# Patient Record
Sex: FEMALE | Race: WHITE | NOT HISPANIC OR LATINO
[De-identification: names, ages, dates, MRNs, and addresses within clinical notes are randomized per-mention and may not be internally consistent; named-entity substitution may affect disease eponyms.]

---

## 2017-04-13 ENCOUNTER — APPOINTMENT (OUTPATIENT)
Dept: PULMONOLOGY | Facility: CLINIC | Age: 72
End: 2017-04-13

## 2017-04-13 VITALS
SYSTOLIC BLOOD PRESSURE: 120 MMHG | RESPIRATION RATE: 17 BRPM | BODY MASS INDEX: 22.47 KG/M2 | WEIGHT: 119 LBS | HEIGHT: 61 IN | OXYGEN SATURATION: 96 % | HEART RATE: 60 BPM | DIASTOLIC BLOOD PRESSURE: 80 MMHG

## 2017-04-13 DIAGNOSIS — Z87.42 PERSONAL HISTORY OF OTHER DISEASES OF THE FEMALE GENITAL TRACT: ICD-10-CM

## 2017-04-13 RX ORDER — ROSUVASTATIN CALCIUM 5 MG/1
5 TABLET, FILM COATED ORAL
Refills: 0 | Status: ACTIVE | COMMUNITY

## 2017-04-17 ENCOUNTER — LABORATORY RESULT (OUTPATIENT)
Age: 72
End: 2017-04-17

## 2017-04-17 LAB
24R-OH-CALCIDIOL SERPL-MCNC: 40.8 PG/ML
25(OH)D3 SERPL-MCNC: 58.4 NG/ML
BASOPHILS # BLD AUTO: 0.02 K/UL
BASOPHILS NFR BLD AUTO: 0.4 %
EOSINOPHIL # BLD AUTO: 0.03 K/UL
EOSINOPHIL NFR BLD AUTO: 0.6 %
HCT VFR BLD CALC: 41 %
HGB BLD-MCNC: 13.6 G/DL
IGE SER-MCNC: 7 IU/ML
IMM GRANULOCYTES NFR BLD AUTO: 0.4 %
LYMPHOCYTES # BLD AUTO: 1.95 K/UL
LYMPHOCYTES NFR BLD AUTO: 36.7 %
MAN DIFF?: NORMAL
MCHC RBC-ENTMCNC: 31.6 PG
MCHC RBC-ENTMCNC: 33.2 GM/DL
MCV RBC AUTO: 95.3 FL
MONOCYTES # BLD AUTO: 0.27 K/UL
MONOCYTES NFR BLD AUTO: 5.1 %
NEUTROPHILS # BLD AUTO: 3.03 K/UL
NEUTROPHILS NFR BLD AUTO: 56.8 %
PLATELET # BLD AUTO: 230 K/UL
RBC # BLD: 4.3 M/UL
RBC # FLD: 13.2 %
WBC # FLD AUTO: 5.32 K/UL

## 2017-04-18 LAB
A ALTERNATA IGE QN: <0.1 KUA/L
A FUMIGATUS IGE QN: <0.1 KUA/L
C ALBICANS IGE QN: <0.1 KUA/L
C HERBARUM IGE QN: <0.1 KUA/L
CAT DANDER IGE QN: <0.1 KUA/L
CLAM IGE QN: <0.1 KUA/L
CODFISH IGE QN: <0.1 KUA/L
COMMON RAGWEED IGE QN: <0.1 KUA/L
CORN IGE QN: <0.1 KUA/L
COW MILK IGE QN: <0.1 KUA/L
D FARINAE IGE QN: <0.1 KUA/L
D PTERONYSS IGE QN: <0.1 KUA/L
DEPRECATED A ALTERNATA IGE RAST QL: 0
DEPRECATED A FUMIGATUS IGE RAST QL: 0
DEPRECATED C ALBICANS IGE RAST QL: 0
DEPRECATED C HERBARUM IGE RAST QL: 0
DEPRECATED CAT DANDER IGE RAST QL: 0
DEPRECATED CLAM IGE RAST QL: 0
DEPRECATED CODFISH IGE RAST QL: 0
DEPRECATED COMMON RAGWEED IGE RAST QL: 0
DEPRECATED CORN IGE RAST QL: 0
DEPRECATED COW MILK IGE RAST QL: 0
DEPRECATED D FARINAE IGE RAST QL: 0
DEPRECATED D PTERONYSS IGE RAST QL: 0
DEPRECATED DOG DANDER IGE RAST QL: 0
DEPRECATED EGG WHITE IGE RAST QL: 0
DEPRECATED M RACEMOSUS IGE RAST QL: 0
DEPRECATED PEANUT IGE RAST QL: 0
DEPRECATED ROACH IGE RAST QL: 0
DEPRECATED SCALLOP IGE RAST QL: <0.1 KUA/L
DEPRECATED SESAME SEED IGE RAST QL: 0
DEPRECATED SHRIMP IGE RAST QL: 0
DEPRECATED SOYBEAN IGE RAST QL: 0
DEPRECATED TIMOTHY IGE RAST QL: 0
DEPRECATED WALNUT IGE RAST QL: 0
DEPRECATED WHEAT IGE RAST QL: 0
DEPRECATED WHITE OAK IGE RAST QL: 0
DOG DANDER IGE QN: <0.1 KUA/L
EGG WHITE IGE QN: <0.1 KUA/L
M RACEMOSUS IGE QN: <0.1 KUA/L
PEANUT IGE QN: <0.1 KUA/L
ROACH IGE QN: <0.1 KUA/L
SCALLOP IGE QN: 0
SCALLOP IGE QN: <0.1 KUA/L
SESAME SEED IGE QN: <0.1 KUA/L
SOYBEAN IGE QN: <0.1 KUA/L
TIMOTHY IGE QN: <0.1 KUA/L
WALNUT IGE QN: <0.1 KUA/L
WHEAT IGE QN: <0.1 KUA/L
WHITE OAK IGE QN: <0.1 KUA/L

## 2017-04-20 ENCOUNTER — FORM ENCOUNTER (OUTPATIENT)
Age: 72
End: 2017-04-20

## 2017-04-21 ENCOUNTER — OUTPATIENT (OUTPATIENT)
Dept: OUTPATIENT SERVICES | Facility: HOSPITAL | Age: 72
LOS: 1 days | End: 2017-04-21
Payer: MEDICARE

## 2017-04-21 ENCOUNTER — APPOINTMENT (OUTPATIENT)
Dept: PULMONOLOGY | Facility: CLINIC | Age: 72
End: 2017-04-21

## 2017-04-21 ENCOUNTER — APPOINTMENT (OUTPATIENT)
Dept: CT IMAGING | Facility: IMAGING CENTER | Age: 72
End: 2017-04-21

## 2017-04-21 DIAGNOSIS — J39.8 OTHER SPECIFIED DISEASES OF UPPER RESPIRATORY TRACT: ICD-10-CM

## 2017-04-21 PROCEDURE — 71250 CT THORAX DX C-: CPT

## 2017-04-25 ENCOUNTER — TRANSCRIPTION ENCOUNTER (OUTPATIENT)
Age: 72
End: 2017-04-25

## 2017-04-27 ENCOUNTER — MEDICATION RENEWAL (OUTPATIENT)
Age: 72
End: 2017-04-27

## 2017-06-06 ENCOUNTER — APPOINTMENT (OUTPATIENT)
Dept: PULMONOLOGY | Facility: CLINIC | Age: 72
End: 2017-06-06

## 2017-06-06 VITALS
HEART RATE: 74 BPM | DIASTOLIC BLOOD PRESSURE: 70 MMHG | OXYGEN SATURATION: 97 % | BODY MASS INDEX: 22.47 KG/M2 | SYSTOLIC BLOOD PRESSURE: 120 MMHG | HEIGHT: 61 IN | RESPIRATION RATE: 14 BRPM | WEIGHT: 119 LBS

## 2017-06-21 ENCOUNTER — APPOINTMENT (OUTPATIENT)
Dept: OBGYN | Facility: CLINIC | Age: 72
End: 2017-06-21

## 2017-06-21 VITALS
BODY MASS INDEX: 22.28 KG/M2 | DIASTOLIC BLOOD PRESSURE: 50 MMHG | SYSTOLIC BLOOD PRESSURE: 108 MMHG | WEIGHT: 118 LBS | HEIGHT: 61 IN

## 2017-06-22 LAB — HPV HIGH+LOW RISK DNA PNL CVX: NEGATIVE

## 2017-06-27 LAB — CYTOLOGY CVX/VAG DOC THIN PREP: NORMAL

## 2017-12-08 ENCOUNTER — OUTPATIENT (OUTPATIENT)
Dept: OUTPATIENT SERVICES | Facility: HOSPITAL | Age: 72
LOS: 1 days | Discharge: HOME | End: 2017-12-08

## 2018-07-12 ENCOUNTER — APPOINTMENT (OUTPATIENT)
Dept: OBGYN | Facility: CLINIC | Age: 73
End: 2018-07-12
Payer: MEDICARE

## 2018-07-12 VITALS
DIASTOLIC BLOOD PRESSURE: 66 MMHG | WEIGHT: 121.25 LBS | HEIGHT: 61 IN | BODY MASS INDEX: 22.89 KG/M2 | SYSTOLIC BLOOD PRESSURE: 107 MMHG | HEART RATE: 77 BPM

## 2018-07-12 DIAGNOSIS — Z86.19 PERSONAL HISTORY OF OTHER INFECTIOUS AND PARASITIC DISEASES: ICD-10-CM

## 2018-07-12 PROCEDURE — G0101: CPT

## 2018-07-12 RX ORDER — AZITHROMYCIN 500 MG/1
500 TABLET, FILM COATED ORAL DAILY
Qty: 5 | Refills: 0 | Status: COMPLETED | COMMUNITY
Start: 2017-06-06 | End: 2018-07-12

## 2018-07-19 LAB — CYTOLOGY CVX/VAG DOC THIN PREP: NORMAL

## 2018-09-14 ENCOUNTER — APPOINTMENT (OUTPATIENT)
Dept: PULMONOLOGY | Facility: CLINIC | Age: 73
End: 2018-09-14
Payer: MEDICARE

## 2018-09-14 ENCOUNTER — NON-APPOINTMENT (OUTPATIENT)
Age: 73
End: 2018-09-14

## 2018-09-14 VITALS
HEART RATE: 69 BPM | RESPIRATION RATE: 18 BRPM | HEIGHT: 62 IN | OXYGEN SATURATION: 95 % | WEIGHT: 122 LBS | DIASTOLIC BLOOD PRESSURE: 54 MMHG | BODY MASS INDEX: 22.45 KG/M2 | SYSTOLIC BLOOD PRESSURE: 116 MMHG

## 2018-09-14 DIAGNOSIS — J45.909 UNSPECIFIED ASTHMA, UNCOMPLICATED: ICD-10-CM

## 2018-09-14 PROCEDURE — 99214 OFFICE O/P EST MOD 30 MIN: CPT | Mod: 25

## 2018-09-14 PROCEDURE — 94010 BREATHING CAPACITY TEST: CPT | Mod: 59

## 2018-09-14 PROCEDURE — 94726 PLETHYSMOGRAPHY LUNG VOLUMES: CPT

## 2018-09-14 PROCEDURE — 94618 PULMONARY STRESS TESTING: CPT

## 2018-09-14 PROCEDURE — 94729 DIFFUSING CAPACITY: CPT

## 2018-09-14 NOTE — HISTORY OF PRESENT ILLNESS
[FreeTextEntry1] : Ms. Lanza is a 73 year old female with a history of abnormal chest CT, chronic cough, GERD, PND, poor sleep, RAD and SOB  presenting to the office today for a follow up visit. Her chief complaint is difficulty breathing. \par -she notes that she was walking one day which was particularly hot, and she began to have some chest tightness and difficulty breathing. she did not have her inhalers at the time and she describes that chest pressure as the same pain that she when she is laying down to go to sleep \par -she notes that she has been having some diarrhea lately\par -she rates her energy a 5-6, and has been sleeping poorly, only getting about 5-6 hours nightly\par -she states that she has not been snoring\par -she notes that her weight has been stable\par -she denies any headaches, nausea, vomiting, fever, chills, sweats, chest pain, chest pressure, diarrhea, constipation, dysphagia, dizziness, leg swelling, leg pain, itchy eyes, itchy ears, or sour taste in the mouth.

## 2018-09-14 NOTE — REASON FOR VISIT
[Follow-Up] : a follow-up visit [FreeTextEntry1] : abnormal chest CT, chronic cough, GERD, PND, poor sleep, RAD and SOB

## 2018-09-14 NOTE — PROCEDURE
[FreeTextEntry1] : PFT - spi reveals normal flows; FEV1 is 2.29 which is 112% of predicted, normal flow volume loop. Normal diffusion , normal volume ; DLCO is 11.9, which is 74% of predicted. \par \par MRI of cervical spine reveals cervical lordosis and disc bulging\par \par 6 minute walk test reveals a low saturation of 74% with no evidence of dyspnea or fatigue; walked 0.29 miles or 475.60 meters

## 2018-09-14 NOTE — ASSESSMENT
[FreeTextEntry1] : Ms. Lanza is a 73 year old female with a history of SOB / mild asthma / GERD / p\par \par problem 1: asthma\par -continue Breo Ellipta 200 at 1 inhalation QD \par -add Ventolin 2 puffs Q6H \par \par -Asthma is  believed to be caused by inherited (genetic) and environmental factor, but its exact cause is unknown. Asthma may be triggered by allergens, lung infections, or irritants in the air. Asthma triggers are different for each person\par -Inhaler technique reviewed as well as oral hygiene techniques reviewed with patient. Avoidance of cold air, extremes of temperature, rescue inhaler should be used before exercise. Order of medication reviewed with patient. Recommended use of a cool mist humidifier in the bedroom. \par \par problem 2: abnormal Chest CT / ?ILDz / CAD \par --related to ?reflux or ?rheumatological factor \par -all rhumatological blood was negative\par -get -follow up chest CT in 6 months (prone and supine)\par \par problem 3: allergy/post nasal drip syndrome\par -being recommended Arm [employer] Saline or Xlear\par -Environmental measures for allergies were encouraged including mattress and pillow cover, air purifier, and environmental controls.\par \par Problem 4: GERD / LPR\par -add Pepsid Complete 20 mg BID \par -being recommended DGL before meals\par \par Things to avoid including overeating, spicy foods, tight clothing, eating within three hours of bed, this list is not all inclusive. \par -Rule of 2s: avoid eating too much, eating too late, eating too spicy, eating two hours before bed\par -For treatment of reflux, possible options discussed including diet control, H2 blockers, PPIs, as well as coating motility agents discussed as treatment options. Timing of meals and proximity of last meal to sleep were discussed. If symptoms persist, a formal gastrointestinal evaluation is needed. \par \par Problem 5: poor mechanics of breathing\par -Proper breathing techniques were reviewed with an emphasis of exhalation. Patient instructed to breath in for 1 second and out for four seconds. Patient was encouraged to not talk while walking. \par \par Problem 6: poor sleep\par -recommended wearing sunglasses 30min before bed\par -continue Xanax prn\par -add Trazodone 50 mg QHS\par -being recommended Sleep Guard\par -Good sleep hygiene was encouraged including avoiding watching television an hour before bed, keeping caffeine at a low, avoiding reading, television, or anything, in bed, no drinking any liquids three hours before bedtime, and only getting into bed when tired and ready for sleep. \par \par problem 7: ?cardiac disease\par -pt is to f/u with cardiologist Dr. Prakash \par \par Problem 8: hypoxemia\par -noted on 6 minute walk test\par -pt is relative asymptomatic\par -this will need to be repeated, cardiology re-evaluation needed\par \par problem 9 : health maintenance \par -recommended yearly flu shot after October 15\par -recommended strep pneumonia vaccines: Prevnar-13 vaccine, followed by Pneumo vaccine 23 one year following\par -recommended early intervention for Upper Respiratory Infections (URIs)\par -recommended regular osteoporosis evaluations\par -recommended early dermatological evaluations\par -recommended after the age of 50 to the age of 70, colonoscopy every 5 years \par \par F/U in 3 months\par She is encouraged to call with any changes, concerns, or questions

## 2018-09-14 NOTE — ADDENDUM
[FreeTextEntry1] : All medical record entries made by vani Ramos were at Dr. Anup Lanza's, direction and personally dictated by me on (9/14/2018). I have reviewed the chart and agree that the record accurately reflects my personal performance of the history, physical exam, assessment and plan. I have also personally directed, reviewed, and agree with the discharge instructions.

## 2018-09-14 NOTE — PHYSICAL EXAM

## 2018-09-20 ENCOUNTER — CLINICAL ADVICE (OUTPATIENT)
Age: 73
End: 2018-09-20

## 2018-09-25 ENCOUNTER — MEDICATION RENEWAL (OUTPATIENT)
Age: 73
End: 2018-09-25

## 2018-09-25 ENCOUNTER — RX RENEWAL (OUTPATIENT)
Age: 73
End: 2018-09-25

## 2018-09-25 RX ORDER — ALBUTEROL SULFATE 90 UG/1
108 (90 BASE) AEROSOL, METERED RESPIRATORY (INHALATION) EVERY 6 HOURS
Qty: 1 | Refills: 1 | Status: ACTIVE | COMMUNITY
Start: 2018-09-25 | End: 1900-01-01

## 2018-10-05 ENCOUNTER — RX CHANGE (OUTPATIENT)
Age: 73
End: 2018-10-05

## 2018-10-29 ENCOUNTER — RX RENEWAL (OUTPATIENT)
Age: 73
End: 2018-10-29

## 2018-10-29 RX ORDER — FLUTICASONE FUROATE AND VILANTEROL TRIFENATATE 200; 25 UG/1; UG/1
200-25 POWDER RESPIRATORY (INHALATION) DAILY
Qty: 3 | Refills: 1 | Status: DISCONTINUED | COMMUNITY
Start: 2018-09-14 | End: 2018-10-29

## 2018-11-26 ENCOUNTER — RX RENEWAL (OUTPATIENT)
Age: 73
End: 2018-11-26

## 2018-11-30 ENCOUNTER — RX RENEWAL (OUTPATIENT)
Age: 73
End: 2018-11-30

## 2018-11-30 RX ORDER — BUDESONIDE AND FORMOTEROL FUMARATE DIHYDRATE 160; 4.5 UG/1; UG/1
160-4.5 AEROSOL RESPIRATORY (INHALATION) TWICE DAILY
Qty: 3 | Refills: 1 | Status: ACTIVE | COMMUNITY
Start: 2018-10-29 | End: 1900-01-01

## 2018-12-03 ENCOUNTER — RX RENEWAL (OUTPATIENT)
Age: 73
End: 2018-12-03

## 2019-02-14 ENCOUNTER — APPOINTMENT (OUTPATIENT)
Dept: PULMONOLOGY | Facility: CLINIC | Age: 74
End: 2019-02-14

## 2019-03-16 ENCOUNTER — RX RENEWAL (OUTPATIENT)
Age: 74
End: 2019-03-16

## 2019-05-12 ENCOUNTER — RX RENEWAL (OUTPATIENT)
Age: 74
End: 2019-05-12

## 2019-05-26 ENCOUNTER — RX RENEWAL (OUTPATIENT)
Age: 74
End: 2019-05-26

## 2019-06-04 ENCOUNTER — RX RENEWAL (OUTPATIENT)
Age: 74
End: 2019-06-04

## 2020-01-31 ENCOUNTER — APPOINTMENT (OUTPATIENT)
Dept: OBGYN | Facility: CLINIC | Age: 75
End: 2020-01-31
Payer: MEDICARE

## 2020-01-31 VITALS — BODY MASS INDEX: 21.77 KG/M2 | WEIGHT: 119 LBS | DIASTOLIC BLOOD PRESSURE: 70 MMHG | SYSTOLIC BLOOD PRESSURE: 104 MMHG

## 2020-01-31 DIAGNOSIS — Z01.419 ENCOUNTER FOR GYNECOLOGICAL EXAMINATION (GENERAL) (ROUTINE) W/OUT ABNORMAL FINDINGS: ICD-10-CM

## 2020-01-31 PROCEDURE — G0101: CPT

## 2020-01-31 NOTE — PHYSICAL EXAM
[Awake] : awake [Alert] : alert [Acute Distress] : no acute distress [LAD] : no lymphadenopathy [Mass] : no breast mass [Nipple Discharge] : no nipple discharge [Axillary LAD] : no axillary lymphadenopathy [Soft] : soft [Tender] : non tender [Oriented x3] : oriented to person, place, and time [Normal] : vagina [No Bleeding] : there was no active vaginal bleeding [Absent] : absent [Uterine Adnexae] : were not tender and not enlarged [Adnexa Tenderness On The Left] : was tender to palpation

## 2020-02-07 LAB
CYTOLOGY CVX/VAG DOC THIN PREP: ABNORMAL
HPV HIGH+LOW RISK DNA PNL CVX: NOT DETECTED

## 2020-09-16 ENCOUNTER — OUTPATIENT (OUTPATIENT)
Dept: OUTPATIENT SERVICES | Facility: HOSPITAL | Age: 75
LOS: 1 days | Discharge: HOME | End: 2020-09-16

## 2020-09-16 DIAGNOSIS — D68.2 HEREDITARY DEFICIENCY OF OTHER CLOTTING FACTORS: ICD-10-CM

## 2020-09-17 ENCOUNTER — RESULT REVIEW (OUTPATIENT)
Age: 75
End: 2020-09-17

## 2020-09-17 ENCOUNTER — LABORATORY RESULT (OUTPATIENT)
Age: 75
End: 2020-09-17

## 2020-09-17 ENCOUNTER — APPOINTMENT (OUTPATIENT)
Dept: HEMATOLOGY ONCOLOGY | Facility: CLINIC | Age: 75
End: 2020-09-17
Payer: MEDICARE

## 2020-09-17 VITALS
WEIGHT: 118.83 LBS | OXYGEN SATURATION: 96 % | TEMPERATURE: 98 F | RESPIRATION RATE: 16 BRPM | DIASTOLIC BLOOD PRESSURE: 70 MMHG | HEIGHT: 61.97 IN | BODY MASS INDEX: 21.87 KG/M2 | HEART RATE: 80 BPM | SYSTOLIC BLOOD PRESSURE: 120 MMHG

## 2020-09-17 DIAGNOSIS — E78.00 PURE HYPERCHOLESTEROLEMIA, UNSPECIFIED: ICD-10-CM

## 2020-09-17 DIAGNOSIS — H54.40 BLINDNESS, ONE EYE, UNSPECIFIED EYE: ICD-10-CM

## 2020-09-17 LAB
ALBUMIN SERPL ELPH-MCNC: 4.3 G/DL
ALP BLD-CCNC: 70 U/L
ALT SERPL-CCNC: 29 U/L
ANION GAP SERPL CALC-SCNC: 11 MMOL/L
APTT BLD: 34.9 SEC
AST SERPL-CCNC: 29 U/L
BILIRUB SERPL-MCNC: 0.4 MG/DL
BUN SERPL-MCNC: 19 MG/DL
CALCIUM SERPL-MCNC: 9.3 MG/DL
CHLORIDE SERPL-SCNC: 102 MMOL/L
CO2 SERPL-SCNC: 26 MMOL/L
CREAT SERPL-MCNC: 0.61 MG/DL
GLUCOSE SERPL-MCNC: 120 MG/DL
INR PPP: 1.04 RATIO
LDH SERPL-CCNC: 302 U/L
POTASSIUM SERPL-SCNC: 5 MMOL/L
PROT SERPL-MCNC: 6.1 G/DL
PT BLD: 12.3 SEC
SODIUM SERPL-SCNC: 140 MMOL/L

## 2020-09-17 PROCEDURE — 99205 OFFICE O/P NEW HI 60 MIN: CPT

## 2020-09-17 RX ORDER — ALPRAZOLAM 0.25 MG/1
0.25 TABLET ORAL
Qty: 30 | Refills: 0 | Status: DISCONTINUED | COMMUNITY
Start: 2018-08-21 | End: 2020-09-17

## 2020-09-17 RX ORDER — TRAZODONE HYDROCHLORIDE 50 MG/1
50 TABLET ORAL
Qty: 60 | Refills: 1 | Status: DISCONTINUED | COMMUNITY
Start: 2018-09-14 | End: 2020-09-17

## 2020-09-20 NOTE — PHYSICAL EXAM
[Fully active, able to carry on all pre-disease performance without restriction] : Status 0 - Fully active, able to carry on all pre-disease performance without restriction [Normal] : affect appropriate [de-identified] : OD retinal change with pupillary defect post surgery [de-identified] : no palpable spleen enlargement

## 2020-09-20 NOTE — ASSESSMENT
[Supportive] : Goals of care discussed with patient: Supportive [Palliative Care Plan] : not applicable at this time [FreeTextEntry1] : Courtney Lanza is a 75 year old female referred for evaluation of mild spleen enlargement of 14 cm on outside health system CT scan. The patient is without symptoms. There is no history of cancer or disease of the hematopoietic system. She does not have chronic fungal or viral infection. The patient may have autoimmune disease related to her chronic pulmonary infiltrates and she is being evaluated by an endocrine physician.\par I recommend that a bone marrow aspiration and biopsy be performed within 2 weeks to evaluate the hematopoietic system. The spleen enlargement is small (14 cm versus normal of 12 cm) and she has a normal CBC. I will repeat blood studies today and view the peripheral blood smear if available. \par Serum LDH is affected by hemolysis and has mild elevation to 302 due to the hemolysis.\par Platelet count near normal at 144 (normal 150). Request bone marrow testing biopsy and aspiration.\par I told the patient that there are many causes for mild spleen enlargement including rheumatologic disease, auto immune disease, blood disorders, vacular disease, thrombosis, trauma, liver dysfunction and infection.\par RTC 3 weeks
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
## 2020-09-20 NOTE — HISTORY OF PRESENT ILLNESS
[Date: ____________] : Patient's last distress assessment performed on [unfilled]. [Disease:__________________________] : Disease: [unfilled] [de-identified] : Courtney Lanza is a 75 year old female who is seen because of an incidental finding of a spleen enlarged.She does does not feel masses in her abdomen and she has not had a diagnosis of a cancer\par .The patient has a history of interstitial lung disease and she is seen by Dr Anne who had ordered a CT scan of the thorax and abdomen in August 2020. THere is the appearance of ground glass appearance of infiltrates in the lung. Her spleen was noted to measure 14 cm in size which was enlarged from prior scans at [facility].\par Her past medical history is remarkable for "thick blood" in the past; blood would not flow easily in a donation.She had seen me in consultation and there was no apparent coagulopathy.\par The patient had routine blood studies in August 2020 which were normal; WBC 4.400 HGB 13.6  00 normal differential neutrophils 53/ lymphocyte 39/ mnocytes 6 eosinophil 0.5. She had a normal CMP and normal creatine, SGOT and SGPT [FreeTextEntry1] : I am here for the evaluation of spleen enlargement [de-identified] : The patient has not noted any change in her abdominal discomfort which has been treated with Prilosec and which has had the clinical picture of GERD. She has no bleeding no facial or trunk rash. She has not noted any abdominal mass

## 2020-09-20 NOTE — REASON FOR VISIT
[Initial Consultation] : an initial consultation for [Blood Count Assessment] : blood count assessment [Family Member] : family member [Other: _____] : [unfilled] [FreeTextEntry2] : I was sent here for the incidental finding of an enlarged splen

## 2020-09-20 NOTE — CONSULT LETTER
[Dear  ___] : Dear  [unfilled], [Consult Letter:] : I had the pleasure of evaluating your patient, [unfilled]. [Please see my note below.] : Please see my note below. [Consult Closing:] : Thank you very much for allowing me to participate in the care of this patient.  If you have any questions, please do not hesitate to contact me. [Sincerely,] : Sincerely, [DrJuan  ___] : Dr. ANDRES [DrJuan ___] : Dr. ANDRES [FreeTextEntry2] : Flores Jesus\par [address] [facility]\par [address] \par [facility] [id number] [FreeTextEntry3] : Matt Garsia

## 2020-09-20 NOTE — RESULTS/DATA
[FreeTextEntry1] : review of data in EM HR of scans and blood testing out of our system; current CBC WBC 3.79 HGB 13  000 normal differential and appearance of blood cells\par  mild hemolysis falsely elevated\par normal CMP and normal hepatic panel

## 2020-09-29 ENCOUNTER — OUTPATIENT (OUTPATIENT)
Dept: OUTPATIENT SERVICES | Facility: HOSPITAL | Age: 75
LOS: 1 days | End: 2020-09-29
Payer: MEDICARE

## 2020-09-29 DIAGNOSIS — D68.2 HEREDITARY DEFICIENCY OF OTHER CLOTTING FACTORS: ICD-10-CM

## 2020-09-29 DIAGNOSIS — J84.9 INTERSTITIAL PULMONARY DISEASE, UNSPECIFIED: ICD-10-CM

## 2020-09-30 ENCOUNTER — RESULT REVIEW (OUTPATIENT)
Age: 75
End: 2020-09-30

## 2020-09-30 ENCOUNTER — APPOINTMENT (OUTPATIENT)
Dept: HEMATOLOGY ONCOLOGY | Facility: CLINIC | Age: 75
End: 2020-09-30
Payer: MEDICARE

## 2020-09-30 VITALS
SYSTOLIC BLOOD PRESSURE: 116 MMHG | BODY MASS INDEX: 21.91 KG/M2 | OXYGEN SATURATION: 93 % | RESPIRATION RATE: 16 BRPM | HEART RATE: 66 BPM | DIASTOLIC BLOOD PRESSURE: 67 MMHG | HEIGHT: 61.97 IN | TEMPERATURE: 99.5 F | WEIGHT: 119.05 LBS

## 2020-09-30 LAB
BASOPHILS # BLD AUTO: 0 K/UL — SIGNIFICANT CHANGE UP (ref 0–0.2)
BASOPHILS NFR BLD AUTO: 0 % — SIGNIFICANT CHANGE UP (ref 0–2)
BURR CELLS BLD QL SMEAR: PRESENT — SIGNIFICANT CHANGE UP
EOSINOPHIL # BLD AUTO: 0.04 K/UL — SIGNIFICANT CHANGE UP (ref 0–0.5)
EOSINOPHIL NFR BLD AUTO: 1 % — SIGNIFICANT CHANGE UP (ref 0–6)
HCT VFR BLD CALC: 37.4 % — SIGNIFICANT CHANGE UP (ref 34.5–45)
HGB BLD-MCNC: 12.5 G/DL — SIGNIFICANT CHANGE UP (ref 11.5–15.5)
LYMPHOCYTES # BLD AUTO: 0.46 K/UL — LOW (ref 1–3.3)
LYMPHOCYTES # BLD AUTO: 13 % — SIGNIFICANT CHANGE UP (ref 13–44)
MCHC RBC-ENTMCNC: 32 PG — SIGNIFICANT CHANGE UP (ref 27–34)
MCHC RBC-ENTMCNC: 33.4 G/DL — SIGNIFICANT CHANGE UP (ref 32–36)
MCV RBC AUTO: 95.7 FL — SIGNIFICANT CHANGE UP (ref 80–100)
MONOCYTES # BLD AUTO: 0.04 K/UL — SIGNIFICANT CHANGE UP (ref 0–0.9)
MONOCYTES NFR BLD AUTO: 1 % — LOW (ref 2–14)
NEUTROPHILS # BLD AUTO: 3.03 K/UL — SIGNIFICANT CHANGE UP (ref 1.8–7.4)
NEUTROPHILS NFR BLD AUTO: 85 % — HIGH (ref 43–77)
NRBC # BLD: 0 /100 — SIGNIFICANT CHANGE UP (ref 0–0)
NRBC # BLD: SIGNIFICANT CHANGE UP /100 WBCS (ref 0–0)
PLAT MORPH BLD: NORMAL — SIGNIFICANT CHANGE UP
PLATELET # BLD AUTO: 202 K/UL — SIGNIFICANT CHANGE UP (ref 150–400)
POIKILOCYTOSIS BLD QL AUTO: SLIGHT — SIGNIFICANT CHANGE UP
RBC # BLD: 3.91 M/UL — SIGNIFICANT CHANGE UP (ref 3.8–5.2)
RBC # FLD: 13 % — SIGNIFICANT CHANGE UP (ref 10.3–14.5)
RBC BLD AUTO: ABNORMAL
WBC # BLD: 3.56 K/UL — LOW (ref 3.8–10.5)
WBC # FLD AUTO: 3.56 K/UL — LOW (ref 3.8–10.5)

## 2020-09-30 PROCEDURE — 38222 DX BONE MARROW BX & ASPIR: CPT | Mod: RT

## 2020-09-30 PROCEDURE — 88237 TISSUE CULTURE BONE MARROW: CPT

## 2020-09-30 PROCEDURE — 88341 IMHCHEM/IMCYTCHM EA ADD ANTB: CPT

## 2020-09-30 PROCEDURE — 88185 FLOWCYTOMETRY/TC ADD-ON: CPT

## 2020-09-30 PROCEDURE — 88360 TUMOR IMMUNOHISTOCHEM/MANUAL: CPT | Mod: 26

## 2020-09-30 PROCEDURE — 85097 BONE MARROW INTERPRETATION: CPT

## 2020-09-30 PROCEDURE — 88280 CHROMOSOME KARYOTYPE STUDY: CPT

## 2020-09-30 PROCEDURE — 88271 CYTOGENETICS DNA PROBE: CPT

## 2020-09-30 PROCEDURE — 88264 CHROMOSOME ANALYSIS 20-25: CPT

## 2020-09-30 PROCEDURE — 88184 FLOWCYTOMETRY/ TC 1 MARKER: CPT

## 2020-09-30 PROCEDURE — 88305 TISSUE EXAM BY PATHOLOGIST: CPT

## 2020-09-30 PROCEDURE — 88305 TISSUE EXAM BY PATHOLOGIST: CPT | Mod: 26

## 2020-09-30 PROCEDURE — 88189 FLOWCYTOMETRY/READ 16 & >: CPT

## 2020-09-30 PROCEDURE — 88341 IMHCHEM/IMCYTCHM EA ADD ANTB: CPT | Mod: 26,59

## 2020-09-30 PROCEDURE — 88313 SPECIAL STAINS GROUP 2: CPT

## 2020-09-30 PROCEDURE — 88313 SPECIAL STAINS GROUP 2: CPT | Mod: 26

## 2020-09-30 PROCEDURE — 88360 TUMOR IMMUNOHISTOCHEM/MANUAL: CPT

## 2020-09-30 PROCEDURE — 88342 IMHCHEM/IMCYTCHM 1ST ANTB: CPT | Mod: 26,59

## 2020-09-30 PROCEDURE — 88342 IMHCHEM/IMCYTCHM 1ST ANTB: CPT

## 2020-09-30 PROCEDURE — 88275 CYTOGENETICS 100-300: CPT

## 2020-09-30 PROCEDURE — 87205 SMEAR GRAM STAIN: CPT

## 2020-09-30 NOTE — PROCEDURE
[Bone Marrow Biopsy] : bone marrow biopsy [Bone Marrow Aspiration] : bone marrow aspiration  [Patient] : the patient [Verbal Consent Obtained] : verbal consent was obtained prior to the procedure [Patient identification verified] : patient identification verified [Procedure verified and consent obtained] : procedure verified and consent obtained [Laterality verified and correct site marked] : laterality verified and correct site marked [Right] : site: right [Correct positioning] : correct positioning [Prone] : prone [Superior iliac spine was identified] : the superior iliac spine was identified. [The right posterior iliac crest was prepped with betadine and draped, using sterile technique.] : The right posterior iliac crest was prepped with betadine and draped, using sterile technique. [Lidocaine was injected and into the periosteum overlying the site.] : Lidocaine was injected and into the periosteum overlying the site. [Aspirate] : aspirate [Cytogenetics] : cytogenetics [FISH] : FISH [Biopsy] : biopsy [Flow Cytometry] : flow cytometry [] : The patient was instructed to remove the bandage the following AM. The patient may bathe. Acetaminophen may be taken for discomfort, as per package directions.If there are any other problems, the patient was instructed to call the office. The patient verbalized understanding, and is aware of the office contact numbers. [FreeTextEntry1] : History of splenomegaly, elevated LDH, and thrombocytopenia [FreeTextEntry2] : WBC: 3.56 \par Hgb: 12.5\par Hct: 37.4\par Plts: 202\par \par Bone marrow aspiration and biopsy were done. MDS panel sent\par Pt is on 325mg ASA daily, extra pressure applied to site post procedure (>20 min)

## 2020-09-30 NOTE — REASON FOR VISIT
[Bone Marrow Biopsy] : bone marrow biopsy [Bone Marrow Aspiration] : bone marrow aspiration [FreeTextEntry2] : History of splenomegaly, elevated LDH, and thrombocytopenia

## 2020-10-05 ENCOUNTER — RESULT REVIEW (OUTPATIENT)
Age: 75
End: 2020-10-05

## 2020-10-05 LAB
HEMATOPATHOLOGY REPORT: SIGNIFICANT CHANGE UP
TM INTERPRETATION: SIGNIFICANT CHANGE UP

## 2020-10-06 ENCOUNTER — RESULT REVIEW (OUTPATIENT)
Age: 75
End: 2020-10-06

## 2020-10-06 ENCOUNTER — APPOINTMENT (OUTPATIENT)
Dept: HEMATOLOGY ONCOLOGY | Facility: CLINIC | Age: 75
End: 2020-10-06
Payer: MEDICARE

## 2020-10-06 VITALS
BODY MASS INDEX: 22.31 KG/M2 | HEIGHT: 61.97 IN | OXYGEN SATURATION: 96 % | WEIGHT: 121.23 LBS | HEART RATE: 62 BPM | RESPIRATION RATE: 16 BRPM | TEMPERATURE: 98.5 F | SYSTOLIC BLOOD PRESSURE: 109 MMHG | DIASTOLIC BLOOD PRESSURE: 64 MMHG

## 2020-10-06 LAB
ALBUMIN SERPL ELPH-MCNC: 4.8 G/DL
ALP BLD-CCNC: 88 U/L
ALT SERPL-CCNC: 33 U/L
ANION GAP SERPL CALC-SCNC: 11 MMOL/L
AST SERPL-CCNC: 26 U/L
B2 MICROGLOB SERPL-MCNC: 2.7 MG/L
BASOPHILS # BLD AUTO: 0.01 K/UL — SIGNIFICANT CHANGE UP (ref 0–0.2)
BASOPHILS NFR BLD AUTO: 0.3 % — SIGNIFICANT CHANGE UP (ref 0–2)
BILIRUB SERPL-MCNC: 0.4 MG/DL
BUN SERPL-MCNC: 21 MG/DL
CALCIUM SERPL-MCNC: 9.6 MG/DL
CHLORIDE SERPL-SCNC: 104 MMOL/L
CO2 SERPL-SCNC: 28 MMOL/L
CREAT SERPL-MCNC: 0.75 MG/DL
EOSINOPHIL # BLD AUTO: 0.03 K/UL — SIGNIFICANT CHANGE UP (ref 0–0.5)
EOSINOPHIL NFR BLD AUTO: 0.8 % — SIGNIFICANT CHANGE UP (ref 0–6)
GLUCOSE SERPL-MCNC: 110 MG/DL
HCT VFR BLD CALC: 40.6 % — SIGNIFICANT CHANGE UP (ref 34.5–45)
HGB BLD-MCNC: 13.3 G/DL — SIGNIFICANT CHANGE UP (ref 11.5–15.5)
IMM GRANULOCYTES NFR BLD AUTO: 0.3 % — SIGNIFICANT CHANGE UP (ref 0–1.5)
LDH SERPL-CCNC: 233 U/L
LYMPHOCYTES # BLD AUTO: 1.05 K/UL — SIGNIFICANT CHANGE UP (ref 1–3.3)
LYMPHOCYTES # BLD AUTO: 27.1 % — SIGNIFICANT CHANGE UP (ref 13–44)
MCHC RBC-ENTMCNC: 31.5 PG — SIGNIFICANT CHANGE UP (ref 27–34)
MCHC RBC-ENTMCNC: 32.8 G/DL — SIGNIFICANT CHANGE UP (ref 32–36)
MCV RBC AUTO: 96.2 FL — SIGNIFICANT CHANGE UP (ref 80–100)
MONOCYTES # BLD AUTO: 0.47 K/UL — SIGNIFICANT CHANGE UP (ref 0–0.9)
MONOCYTES NFR BLD AUTO: 12.1 % — SIGNIFICANT CHANGE UP (ref 2–14)
NEUTROPHILS # BLD AUTO: 2.3 K/UL — SIGNIFICANT CHANGE UP (ref 1.8–7.4)
NEUTROPHILS NFR BLD AUTO: 59.4 % — SIGNIFICANT CHANGE UP (ref 43–77)
NRBC # BLD: 0 /100 WBCS — SIGNIFICANT CHANGE UP (ref 0–0)
PLATELET # BLD AUTO: 209 K/UL — SIGNIFICANT CHANGE UP (ref 150–400)
POTASSIUM SERPL-SCNC: 4.6 MMOL/L
PROT SERPL-MCNC: 6.4 G/DL
RBC # BLD: 4.22 M/UL — SIGNIFICANT CHANGE UP (ref 3.8–5.2)
RBC # FLD: 12.6 % — SIGNIFICANT CHANGE UP (ref 10.3–14.5)
SODIUM SERPL-SCNC: 143 MMOL/L
WBC # BLD: 3.87 K/UL — SIGNIFICANT CHANGE UP (ref 3.8–10.5)
WBC # FLD AUTO: 3.87 K/UL — SIGNIFICANT CHANGE UP (ref 3.8–10.5)

## 2020-10-06 PROCEDURE — 99215 OFFICE O/P EST HI 40 MIN: CPT

## 2020-10-07 LAB
CHROM ANALY INTERPHASE BLD FISH-IMP: SIGNIFICANT CHANGE UP
CHROM ANALY INTERPHASE BLD FISH-IMP: SIGNIFICANT CHANGE UP
HAV IGM SER QL: NONREACTIVE
HBV CORE IGM SER QL: NONREACTIVE
HBV SURFACE AG SER QL: NONREACTIVE
HCV AB SER QL: NONREACTIVE
HCV S/CO RATIO: 0.03 S/CO

## 2020-10-08 LAB — CHROM ANALY OVERALL INTERP SPEC-IMP: SIGNIFICANT CHANGE UP

## 2020-10-08 NOTE — HISTORY OF PRESENT ILLNESS
[Disease:__________________________] : Disease: [unfilled] [R-FLIPI Score: ___] : R-FLIPI Score: [unfilled] [Date: ____________] : Patient's last distress assessment performed on [unfilled]. [0 - No Distress] : Distress Level: 0 [de-identified] : Courtney Lanza is a 75 year old female who is seen because of an incidental finding of a spleen enlarged.She does does not feel masses in her abdomen and she has not had a diagnosis of a cancer\par .The patient has a history of interstitial lung disease and she is seen by Dr Anne who had ordered a CT scan of the thorax and abdomen in August 2020. THere is the appearance of ground glass appearance of infiltrates in the lung. Her spleen was noted to measure 14 cm in size which was enlarged from prior scans at [facility].\par Her past medical history is remarkable for "thick blood" in the past; blood would not flow easily in a donation.She had seen me in consultation and there was no apparent coagulopathy.\par The patient had routine blood studies in August 2020 which were normal; WBC 4.400 HGB 13.6  00 normal differential neutrophils 53/ lymphocyte 39/ mnocytes 6 eosinophil 0.5. She had a normal CMP and normal creatine, SGOT and SGPT [FreeTextEntry1] : discussion of bone marrow results [de-identified] : Courtney has been working and she has no fever, no chills , no weight loss or fatigue; She has felt well ; The news of the bone marrow showing B cell infiltration has caused concern and today's meeting is to explain to her the nature of the findings and to reassure her about future plans of care.\par No exacerbation of her pulmonary symptoms which I have told her do not appear to be related to B cell lymphoma

## 2020-10-08 NOTE — ASSESSMENT
[Supportive] : Goals of care discussed with patient: Supportive [Palliative Care Plan] : not applicable at this time [FreeTextEntry1] : Courtney Lanza is a 75 year old female referred for evaluation of a mild increase in size of her spleen noted on CT scanning in August 2020. She had no significant lymphadenopathy and no prior history of hematological disease.\par She had a normal CBC although on the day of the bone marrow biopsy she had a slight lowering of the total white cell count.She is not neutropenic. 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 I explained the nature of low grade lymphoma; there are many options including watching the patient off therapy or if the patient prefers the use of IV or oral chemotherapy with immune therapy (rituximab) directed against the CD 20 B cells. Education was offered in this discussion. \par I will request CT/PET imaging to see if small lymph nodes show FDG uptake and if a lymph node biopsy may be considered for confirmation of diagnosis.\par Marginal zone lymphomas of spleen may result in spleen enlargement over time and may require treatment if alterations in blood counts are noted.

## 2020-10-08 NOTE — REASON FOR VISIT
[Initial Consultation] : an initial consultation for [Lymphoma] : lymphoma [Family Member] : family member [Other: _____] : [unfilled] [FreeTextEntry2] : I am here to discuss the findings of the bone marrow

## 2020-10-08 NOTE — REVIEW OF SYSTEMS
[Fatigue] : fatigue [Vision Problems] : vision problems [Negative] : Allergic/Immunologic [Recent Change In Weight] : ~T no recent weight change

## 2020-10-10 ENCOUNTER — APPOINTMENT (OUTPATIENT)
Dept: NUCLEAR MEDICINE | Facility: CLINIC | Age: 75
End: 2020-10-10
Payer: MEDICARE

## 2020-10-10 ENCOUNTER — OUTPATIENT (OUTPATIENT)
Dept: OUTPATIENT SERVICES | Facility: HOSPITAL | Age: 75
LOS: 1 days | End: 2020-10-10
Payer: MEDICARE

## 2020-10-10 DIAGNOSIS — Z00.8 ENCOUNTER FOR OTHER GENERAL EXAMINATION: ICD-10-CM

## 2020-10-10 PROCEDURE — 78815 PET IMAGE W/CT SKULL-THIGH: CPT | Mod: 26,PI

## 2020-10-10 PROCEDURE — A9552: CPT

## 2020-10-10 PROCEDURE — 78815 PET IMAGE W/CT SKULL-THIGH: CPT

## 2020-10-14 ENCOUNTER — TRANSCRIPTION ENCOUNTER (OUTPATIENT)
Age: 75
End: 2020-10-14

## 2020-10-16 ENCOUNTER — APPOINTMENT (OUTPATIENT)
Dept: HEMATOLOGY ONCOLOGY | Facility: CLINIC | Age: 75
End: 2020-10-16
Payer: MEDICARE

## 2020-10-16 VITALS
BODY MASS INDEX: 21.91 KG/M2 | OXYGEN SATURATION: 97 % | SYSTOLIC BLOOD PRESSURE: 102 MMHG | HEIGHT: 61.97 IN | DIASTOLIC BLOOD PRESSURE: 65 MMHG | RESPIRATION RATE: 15 BRPM | WEIGHT: 119.05 LBS | TEMPERATURE: 97.9 F | HEART RATE: 72 BPM

## 2020-10-16 PROCEDURE — 99215 OFFICE O/P EST HI 40 MIN: CPT

## 2020-10-19 NOTE — REASON FOR VISIT
[Follow-Up Visit] : a follow-up visit for [Lymphoma] : lymphoma [Family Member] : family member [Other: _____] : [unfilled] [Blood Count Assessment] : blood count assessment [FreeTextEntry2] : follow up to discuss

## 2020-10-19 NOTE — HISTORY OF PRESENT ILLNESS
[Disease:__________________________] : Disease: [unfilled] [R-FLIPI Score: ___] : R-FLIPI Score: [unfilled] [Date: ____________] : Patient's last distress assessment performed on [unfilled]. [1 - Distress Level] : Distress Level: 1 [de-identified] : Courtney Lanza is a 75 year old female who is seen because of an incidental finding of a spleen enlarged.She does does not feel masses in her abdomen and she has not had a diagnosis of a cancer\par .The patient has a history of interstitial lung disease and she is seen by Dr Anne who had ordered a CT scan of the thorax and abdomen in August 2020. THere is the appearance of ground glass appearance of infiltrates in the lung. Her spleen was noted to measure 14 cm in size which was enlarged from prior scans at [facility].\par Her past medical history is remarkable for "thick blood" in the past; blood would not flow easily in a donation.She had seen me in consultation and there was no apparent coagulopathy.\par The patient had routine blood studies in August 2020 which were normal; WBC 4.400 HGB 13.6  00 normal differential neutrophils 53/ lymphocyte 39/ mnocytes 6 eosinophil 0.5. She had a normal CMP and normal creatine, SGOT and SGPT [FreeTextEntry1] : discussion of proposed immune therapy with rituximab [de-identified] : She has had a CT/PET scan performed this week and has had mild anxiety about results. No fever no fatigue no weight loss and no B systems. She is wearing a mask practicing social distancing and following CDC guidelines in treatment. She has had her influenza vaccination [FreeTextEntry7] : nervousness about CT/PET scan

## 2020-10-19 NOTE — RESULTS/DATA
[FreeTextEntry1] : review of information in the EM HR\par copies of blood testing and CT/PET scan results printed and provided to patient with an explanation

## 2020-10-19 NOTE — ASSESSMENT
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 There is no cervical, intrathoracic ,intra abdominal or pelvic lymph  node enlargement.\par The scan suggests that the spleen may be the only image assessable organ of disease (low grade B cell lymphoma has been found in the bone marrow). I discussed the option deferring therapy until bulk lymph adenopathy or symptoms are seen in which case I would use rituximab and chemotherapy (options include cyclophosphamide, CHOP , CVP or bendamustine). \par If she desires to receive chemotherapy at this time, I have recommended rituximab. I have provide printed educational material on the use and side effects of rituximab treatment and I discussed the treatment with the patient and her two daughters who were present on cell phone. I detailed side effects as discussed in the educational material including but not limited to infusion reactions allergy phenomena, fever, lowering blood pressure, and lowering of immunity to viral infection.  Family visitation limited during COVID 19 pandemic. \par She is not fully decided to receive immune therapy with rituximab as it does lower CD 20 B cells and may lead to increased viral infections. In the current pandemic she may elect to have observation rather than receive B cell suppression therapy. Although the risk  of COViD 19 incidence is not known to be increased in lymphoma patients; I have told the patient that the [facility] patient experience showed worse survival outcome of cancer chemotherapy patients receiving chemotherapy when infected with novel corona virus SARS type 2.\par She is also concerned about vising her grandchildren if she is on chemotherapy and this was discussed in detail\par Jessica will be seen in follow up in 2 weeks\par She has had influenza vaccination this season.

## 2020-10-30 ENCOUNTER — OUTPATIENT (OUTPATIENT)
Dept: OUTPATIENT SERVICES | Facility: HOSPITAL | Age: 75
LOS: 1 days | Discharge: HOME | End: 2020-10-30

## 2020-10-30 DIAGNOSIS — D68.2 HEREDITARY DEFICIENCY OF OTHER CLOTTING FACTORS: ICD-10-CM

## 2020-11-04 ENCOUNTER — APPOINTMENT (OUTPATIENT)
Dept: HEMATOLOGY ONCOLOGY | Facility: CLINIC | Age: 75
End: 2020-11-04

## 2020-11-09 ENCOUNTER — RESULT REVIEW (OUTPATIENT)
Age: 75
End: 2020-11-09

## 2020-11-09 ENCOUNTER — APPOINTMENT (OUTPATIENT)
Dept: HEMATOLOGY ONCOLOGY | Facility: CLINIC | Age: 75
End: 2020-11-09
Payer: MEDICARE

## 2020-11-09 VITALS
OXYGEN SATURATION: 95 % | DIASTOLIC BLOOD PRESSURE: 75 MMHG | SYSTOLIC BLOOD PRESSURE: 127 MMHG | HEIGHT: 60.63 IN | WEIGHT: 119.71 LBS | BODY MASS INDEX: 22.9 KG/M2 | RESPIRATION RATE: 16 BRPM | HEART RATE: 71 BPM | TEMPERATURE: 98.5 F
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
## 2020-11-09 DIAGNOSIS — M81.0 AGE-RELATED OSTEOPOROSIS W/OUT CURRENT PATHOLOGICAL FRACTURE: ICD-10-CM

## 2020-11-09 DIAGNOSIS — J40 BRONCHITIS, NOT SPECIFIED AS ACUTE OR CHRONIC: ICD-10-CM

## 2020-11-09 DIAGNOSIS — S72.002D FRACTURE OF UNSPECIFIED PART OF NECK OF LEFT FEMUR, SUBSEQUENT ENCOUNTER FOR CLOSED FRACTURE WITH ROUTINE HEALING: ICD-10-CM

## 2020-11-09 DIAGNOSIS — S32.312S: ICD-10-CM

## 2020-11-09 DIAGNOSIS — J18.9 BRONCHITIS, NOT SPECIFIED AS ACUTE OR CHRONIC: ICD-10-CM

## 2020-11-09 LAB
BASOPHILS # BLD AUTO: 0.02 K/UL — SIGNIFICANT CHANGE UP (ref 0–0.2)
BASOPHILS NFR BLD AUTO: 0.4 % — SIGNIFICANT CHANGE UP (ref 0–2)
EOSINOPHIL # BLD AUTO: 0.03 K/UL — SIGNIFICANT CHANGE UP (ref 0–0.5)
EOSINOPHIL NFR BLD AUTO: 0.5 % — SIGNIFICANT CHANGE UP (ref 0–6)
HCT VFR BLD CALC: 38.4 % — SIGNIFICANT CHANGE UP (ref 34.5–45)
HGB BLD-MCNC: 12.9 G/DL — SIGNIFICANT CHANGE UP (ref 11.5–15.5)
IMM GRANULOCYTES NFR BLD AUTO: 1.1 % — SIGNIFICANT CHANGE UP (ref 0–1.5)
LYMPHOCYTES # BLD AUTO: 1.09 K/UL — SIGNIFICANT CHANGE UP (ref 1–3.3)
LYMPHOCYTES # BLD AUTO: 19.1 % — SIGNIFICANT CHANGE UP (ref 13–44)
MCHC RBC-ENTMCNC: 31.8 PG — SIGNIFICANT CHANGE UP (ref 27–34)
MCHC RBC-ENTMCNC: 33.6 G/DL — SIGNIFICANT CHANGE UP (ref 32–36)
MCV RBC AUTO: 94.6 FL — SIGNIFICANT CHANGE UP (ref 80–100)
MONOCYTES # BLD AUTO: 0.52 K/UL — SIGNIFICANT CHANGE UP (ref 0–0.9)
MONOCYTES NFR BLD AUTO: 9.1 % — SIGNIFICANT CHANGE UP (ref 2–14)
NEUTROPHILS # BLD AUTO: 3.98 K/UL — SIGNIFICANT CHANGE UP (ref 1.8–7.4)
NEUTROPHILS NFR BLD AUTO: 69.8 % — SIGNIFICANT CHANGE UP (ref 43–77)
NRBC # BLD: 0 /100 WBCS — SIGNIFICANT CHANGE UP (ref 0–0)
PLATELET # BLD AUTO: 195 K/UL — SIGNIFICANT CHANGE UP (ref 150–400)
RBC # BLD: 4.06 M/UL — SIGNIFICANT CHANGE UP (ref 3.8–5.2)
RBC # FLD: 12.4 % — SIGNIFICANT CHANGE UP (ref 10.3–14.5)
RETICS #: 71.5 K/UL — SIGNIFICANT CHANGE UP (ref 25–125)
RETICS/RBC NFR: 1.8 % — SIGNIFICANT CHANGE UP (ref 0.5–2.5)
WBC # BLD: 5.7 K/UL — SIGNIFICANT CHANGE UP (ref 3.8–10.5)
WBC # FLD AUTO: 5.7 K/UL — SIGNIFICANT CHANGE UP (ref 3.8–10.5)

## 2020-11-09 PROCEDURE — 99215 OFFICE O/P EST HI 40 MIN: CPT

## 2020-11-09 RX ORDER — MELOXICAM 15 MG/1
15 TABLET ORAL
Qty: 30 | Refills: 0 | Status: DISCONTINUED | COMMUNITY
Start: 2018-08-31 | End: 2020-11-09

## 2020-11-09 NOTE — REASON FOR VISIT
[Initial Consultation] : an initial consultation for [Blood Count Assessment] : blood count assessment [Family Member] : family member [Other: _____] : [unfilled]

## 2020-11-12 LAB
ALBUMIN MFR SERPL ELPH: 66.4 %
ALBUMIN SERPL ELPH-MCNC: 4.6 G/DL
ALBUMIN SERPL-MCNC: 4.1 G/DL
ALBUMIN/GLOB SERPL: 2 RATIO
ALP BLD-CCNC: 91 U/L
ALPHA1 GLOB MFR SERPL ELPH: 4.5 %
ALPHA1 GLOB SERPL ELPH-MCNC: 0.3 G/DL
ALPHA2 GLOB MFR SERPL ELPH: 11.5 %
ALPHA2 GLOB SERPL ELPH-MCNC: 0.7 G/DL
ALT SERPL-CCNC: 32 U/L
ANION GAP SERPL CALC-SCNC: 13 MMOL/L
AST SERPL-CCNC: 28 U/L
B-GLOBULIN MFR SERPL ELPH: 10.6 %
B-GLOBULIN SERPL ELPH-MCNC: 0.6 G/DL
B2 MICROGLOB SERPL-MCNC: 2.4 MG/L
BILIRUB SERPL-MCNC: 0.3 MG/DL
BUN SERPL-MCNC: 28 MG/DL
CALCIUM SERPL-MCNC: 9.6 MG/DL
CHLORIDE SERPL-SCNC: 101 MMOL/L
CO2 SERPL-SCNC: 26 MMOL/L
CREAT SERPL-MCNC: 0.73 MG/DL
DEPRECATED KAPPA LC FREE/LAMBDA SER: 1.15 RATIO
FERRITIN SERPL-MCNC: 59 NG/ML
GAMMA GLOB FLD ELPH-MCNC: 0.4 G/DL
GAMMA GLOB MFR SERPL ELPH: 7 %
GLUCOSE SERPL-MCNC: 106 MG/DL
HBV CORE IGG+IGM SER QL: NONREACTIVE
HBV SURFACE AB SER QL: NONREACTIVE
HBV SURFACE AG SER QL: NONREACTIVE
HIV1+2 AB SPEC QL IA.RAPID: NONREACTIVE
IGA SER QL IEP: 70 MG/DL
IGG SER QL IEP: 414 MG/DL
IGM SER QL IEP: 38 MG/DL
INTERPRETATION SERPL IEP-IMP: NORMAL
IRON SATN MFR SERPL: 23 %
IRON SERPL-MCNC: 73 UG/DL
KAPPA LC CSF-MCNC: 0.97 MG/DL
KAPPA LC SERPL-MCNC: 1.12 MG/DL
LDH SERPL-CCNC: 205 U/L
M PROTEIN SPEC IFE-MCNC: NORMAL
POTASSIUM SERPL-SCNC: 5 MMOL/L
PROT SERPL-MCNC: 6.1 G/DL
SODIUM SERPL-SCNC: 141 MMOL/L
TIBC SERPL-MCNC: 319 UG/DL
UIBC SERPL-MCNC: 246 UG/DL
VIT B12 SERPL-MCNC: 852 PG/ML

## 2020-11-15 PROBLEM — J40 FREQUENT EPISODES OF BRONCHITIS AND PNEUMONIA: Status: ACTIVE | Noted: 2017-04-13

## 2020-11-15 PROBLEM — M81.0 OSTEOPOROSIS, POST-MENOPAUSAL: Status: ACTIVE | Noted: 2020-11-15
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
## 2020-11-15 NOTE — PHYSICAL EXAM
[Fully active, able to carry on all pre-disease performance without restriction] : Status 0 - Fully active, able to carry on all pre-disease performance without restriction [Normal] : normal spine exam without palpable tenderness, no kyphosis or scoliosis [de-identified] : spleen palp 2 FB below LCM on inspiration, no abd mass or hepatomegaly [de-identified] : no CVAT

## 2020-11-15 NOTE — ASSESSMENT
[Palliative Care Plan] : not applicable at this time [FreeTextEntry1] : Patient is a 75-year-old woman referred for evaluation of mild splenomegaly.  She has significant reflux symptoms as well as early satiety.\par She has no constitutional c/o or LAD.\par She was seen at [facility] recently, where treatment on a clinical trial was recommended with the reason for treatment being the GI complaints.  I told the patient and her daughter that I think it unlikely that her complaints are related to what is rather modest splenomegaly.  Regardless, her GI evaluation needs to be completed and should include upper endoscopy.  In addition to PPI, there are other medical therapies that could be considered in this situation.  I reviewed with the patient and her daughter the I reviewed with the patient and her daughter the L5 guidelines for treatment of follicular lymphoma and other could be extrapolated to this variant of low-grade lymphoproliferative disease.  I also noted that the guidelines are not inviolable and other considerations could arise justifying start of therapy.  I told her that I would support a clinical trial if offered as initial therapy but that I did not think that treatment from my standpoint is needed now.\par Her pulmonary disease is of uncertain etiology.  There has been some consideration that this could represent sarcoid.  She will have a repeat CT of the chest within the next 2-3 months or so at which time a decision can be made as to whether or not she should have a lung biopsy.   No FDG avid findings are described in the lungs.  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  Pulmonary involvement with  marginal zone lymphoma could justify starting treatment if this is demonstrated, especially if she has persistent cough and exertional dyspnea.\par CBC, diff is normal today.  Immunoelectrophoresis,  hepatitis B core and HIV antibodies were ordered to complete baseline bloodwork. \par \par She was urged to see her gastroenterologist and return here if she wishes to be followed here in three months. [Palliative] : Goals of care discussed with patient: Palliative

## 2020-11-15 NOTE — HISTORY OF PRESENT ILLNESS
[Disease:__________________________] : Disease: [unfilled] [Date: ____________] : Patient's last distress assessment performed on [unfilled]. [de-identified] : Ms. 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  Splenomegaly was an incidental finding on a chest CT from 8/2020 which demonstrated bilateral groundglass infiltrates.  She is being followed by Dr. Anne for this.  Abdominal ultrasound from 11/5/2020 showed the spleen measuring 14.5 cm.  A 0.9 cm splenule was seen.  PET/CT showed the mild splenomegaly as well as diffuse low-level SUV positivity.  Pulmonary uptake was not described.  A tiny RUL lung nodule was seen which was too small to characterize. \par Bone marrow aspirate and biopsy on 9/30/2020 showed a hypercellular marrow involved by a CD5 and CD10 negative low-grade B-cell lymphoma, which made up about 10% of marrow cells.  By flow, there was a small (2.5%) monotypic B-cell population expressing kappa, CD19, CD20, CD22, CD11c (dim), FMC 7, minimal CD25 and negative for CD5, CD10, CD23 and .  CLL FISH panel was negative, including TP53.  FISH for BIRC3/MALT1 was also negative.\par CBC, differential and platelets have been for the most part normal.  Beta-2 MG was 2.7 on 10/6/2020.  CMP at that time was normal.  LDH was normal. \par Her major complaints are GI related: significant reflux sxs as well as early satiety.\par She has no constitutional complaints.\par  [FreeTextEntry1] : I am here for the evaluation of spleen enlargement [de-identified] : Initial visit with me, previously seen here by Dr. Jose Daniel Garsia.

## 2020-11-15 NOTE — CONSULT LETTER
[Dear  ___] : Dear  [unfilled], [Consult Letter:] : I had the pleasure of evaluating your patient, [unfilled]. [Please see my note below.] : Please see my note below. [Consult Closing:] : Thank you very much for allowing me to participate in the care of this patient.  If you have any questions, please do not hesitate to contact me. [Sincerely,] : Sincerely, [DrJuan  ___] : Dr. ANDRES [FreeTextEntry2] : Flores Jeuss M.D.\par  [DrJuan ___] : Dr. ANDRES

## 2020-11-23 ENCOUNTER — APPOINTMENT (OUTPATIENT)
Dept: UROLOGY | Facility: CLINIC | Age: 75
End: 2020-11-23
Payer: MEDICARE

## 2020-11-23 DIAGNOSIS — D17.71 BENIGN LIPOMATOUS NEOPLASM OF KIDNEY: ICD-10-CM

## 2020-11-23 PROCEDURE — 99203 OFFICE O/P NEW LOW 30 MIN: CPT

## 2020-12-02 ENCOUNTER — APPOINTMENT (OUTPATIENT)
Dept: THORACIC SURGERY | Facility: CLINIC | Age: 75
End: 2020-12-02

## 2020-12-21 ENCOUNTER — APPOINTMENT (OUTPATIENT)
Dept: PEDIATRIC ALLERGY IMMUNOLOGY | Facility: CLINIC | Age: 75
End: 2020-12-21
Payer: MEDICARE

## 2020-12-21 VITALS
SYSTOLIC BLOOD PRESSURE: 117 MMHG | DIASTOLIC BLOOD PRESSURE: 72 MMHG | HEART RATE: 63 BPM | HEIGHT: 60 IN | WEIGHT: 119 LBS | TEMPERATURE: 96.3 F | BODY MASS INDEX: 23.36 KG/M2 | OXYGEN SATURATION: 94 %

## 2020-12-21 DIAGNOSIS — J18.9 PNEUMONIA, UNSPECIFIED ORGANISM: ICD-10-CM

## 2020-12-21 DIAGNOSIS — R09.82 POSTNASAL DRIP: ICD-10-CM

## 2020-12-21 PROCEDURE — 36415 COLL VENOUS BLD VENIPUNCTURE: CPT

## 2020-12-21 PROCEDURE — 95004 PERQ TESTS W/ALRGNC XTRCS: CPT

## 2020-12-21 PROCEDURE — 99204 OFFICE O/P NEW MOD 45 MIN: CPT | Mod: 25

## 2020-12-21 RX ORDER — ALBUTEROL SULFATE 90 UG/1
108 (90 BASE) AEROSOL, METERED RESPIRATORY (INHALATION) EVERY 6 HOURS
Qty: 3 | Refills: 1 | Status: COMPLETED | COMMUNITY
Start: 2018-09-25 | End: 2020-12-21

## 2020-12-21 RX ORDER — GLIMEPIRIDE 1 MG/1
1 TABLET ORAL
Qty: 30 | Refills: 0 | Status: COMPLETED | COMMUNITY
Start: 2020-08-27

## 2020-12-21 RX ORDER — MONTELUKAST 10 MG/1
10 TABLET, FILM COATED ORAL
Qty: 90 | Refills: 0 | Status: ACTIVE | COMMUNITY
Start: 2020-09-22

## 2020-12-21 RX ORDER — ALENDRONATE SODIUM 70 MG/1
70 TABLET ORAL
Qty: 12 | Refills: 0 | Status: COMPLETED | COMMUNITY
Start: 2020-09-22

## 2020-12-21 RX ORDER — BLOOD SUGAR DIAGNOSTIC
STRIP MISCELLANEOUS
Qty: 100 | Refills: 0 | Status: ACTIVE | COMMUNITY
Start: 2019-09-23

## 2020-12-21 RX ORDER — FAMOTIDINE 40 MG/1
40 TABLET, FILM COATED ORAL
Refills: 0 | Status: COMPLETED | COMMUNITY
Start: 2020-11-09 | End: 2020-12-21

## 2020-12-21 RX ORDER — PREDNISONE 10 MG/1
10 TABLET ORAL
Qty: 30 | Refills: 0 | Status: COMPLETED | COMMUNITY
Start: 2020-08-26

## 2020-12-21 RX ORDER — ALBUTEROL SULFATE 90 UG/1
108 (90 BASE) AEROSOL, METERED RESPIRATORY (INHALATION)
Qty: 1 | Refills: 1 | Status: COMPLETED | COMMUNITY
Start: 2017-04-27 | End: 2020-12-21

## 2020-12-26 ENCOUNTER — NON-APPOINTMENT (OUTPATIENT)
Age: 75
End: 2020-12-26

## 2020-12-26 LAB
C TETANI IGG SER-ACNC: 0.58 IU/ML
CD16+CD56+ CELLS # BLD: 180 /UL
CD16+CD56+ CELLS NFR BLD: 14 %
CD19 CELLS NFR BLD: 280 /UL
CD3 CELLS # BLD: 806 /UL
CD3 CELLS NFR BLD: 64 %
CD3+CD4+ CELLS # BLD: 497 /UL
CD3+CD4+ CELLS NFR BLD: 41 %
CD3+CD4+ CELLS/CD3+CD8+ CLL SPEC: 1.78 RATIO
CD3+CD8+ CELLS # SPEC: 280 /UL
CD3+CD8+ CELLS NFR BLD: 23 %
CELLS.CD3-CD19+/CELLS IN BLOOD: 21 %
CH50 SERPL-MCNC: >100 U/ML
COMPLEMENT, ALTERNATE PATHWAY (AH50): >110
DEPRECATED KAPPA LC FREE/LAMBDA SER: 0.95 RATIO
IGA SER QL IEP: 73 MG/DL
IGG SER QL IEP: 389 MG/DL
IGM SER QL IEP: 35 MG/DL
KAPPA LC CSF-MCNC: 1.01 MG/DL
KAPPA LC SERPL-MCNC: 0.96 MG/DL
M PROTEIN SPEC IFE-MCNC: NORMAL
TOTAL IGE SMQN RAST: 5 KU/L

## 2020-12-26 NOTE — HISTORY OF PRESENT ILLNESS
[de-identified] : ALLYSSA NAVA is a 75 year old White female with coronary artery disease, ILD, prediabetes and recently diagnosed lymphoma who presents for evaluation of low gammaglobulin levels. She was diagnosed with marginal zone lymphoma (Dx 9/2020 via [facility]; follows with Heme/onc, Dr West; on surveillance). She had recent labs from 11/9/2020 that showed diminished IgG and IgA levels. \par \par She reports chronic history of chronic sinopulmonary infections, and reports having 3-4 URIs every year for a long time. She also reports 5 episodes of pneumonia in the past, which she reports were all radiographically confirmed; last was 4 years. However, she says over the past 4 years, she has had an improvement to 0-1 sinopulmonary infections per year. Over the past year, she denies 0 URIs, and she has been prescribed 0 courses of antibiotics. She denies any history of MRSA, bacteremia, fungemia. She denies any family history of immunodeficiency, autoimmunity, miscarriages or early infant deaths. She reports being up to date with all recommended age-appropriate immunizations.\par - she reports  that she received PPSV23 in 2018 and PCV13 - in the fall of 2020\par \par She reports a history of seasonal allergies, usually eye itching, sore throat and post-nasal drip. She reports that these symptoms are worse in the spring time. She reports no antihistamine use, but did use Singulair last night.\par \par She also reports history of adverse reaction to sulfa (Sulfafurazole/Ganitrisin) about 30 years ago. She reports that she was taking the medication for about 3 days and then developed fever to 106F and hypotension. She denies associated bronchospasm, GI symptoms or hives. She has been avoiding all sulfa drugs since then.

## 2020-12-26 NOTE — CONSULT LETTER
[Dear  ___] : Dear  [unfilled], [Consult Letter:] : I had the pleasure of evaluating your patient, [unfilled]. [Please see my note below.] : Please see my note below. [This report is provisional, pending the completion of the evaluation.  A final diagnosis and plan will follow.] : This report is provisional, pending the completion of the evaluation.  A final diagnosis and plan will follow. [Consult Closing:] : Thank you very much for allowing me to participate in the care of this patient.  If you have any questions, please do not hesitate to contact me. [Sincerely,] : Sincerely, [FreeTextEntry2] : LEO CHERRY [FreeTextEntry3] : Gab Nicholas MD\par ___________________________________\par Fellow, Division of Allergy and Immunology\par [facility] [employer] at [facility]\par [facility]\par \par \par \par  [DrJuan  ___] : Dr. ANDRES

## 2020-12-26 NOTE — SOCIAL HISTORY
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

## 2020-12-26 NOTE — REVIEW OF SYSTEMS
[Nl] : Genitourinary [Immunizations are up to date] : Immunizations are up to date [Received Influenza Vaccine this Past Year] : patient has received the Influenza vaccine this past year [Eye Discharge] : eye discharge [Eye Itching] : itchy eyes [Sore Throat] : sore throat [Post Nasal Drip] : post nasal drip [Cough] : cough [Recurrent Sinus Infections] : recurrent sinus infections [Recurrent Bronchitis] : recurrent bronchitis [Recurrent Pneumonia] : ~T recurrent pneumonia [Eye Redness] : no redness [Dry Eyes] : no dryness ~T of the eyes [Swollen Eyelids] : no ~T ~L swollen eyelids [Rhinorrhea] : no rhinorrhea [Nasal Congestion] : no nasal congestion [Throat Itching] : no throat itching [Sneezing] : no sneezing [Recurrent Throat Infections] : no recurrence of throat infections [Recurrent Ear Infections] : no recurrence or ear infections [Recurrent Skin Infections] : no recurrent skin infections

## 2020-12-28 LAB — MANNAN BINDING LECTIN (MBL): 1327 NG/ML

## 2021-01-06 ENCOUNTER — NON-APPOINTMENT (OUTPATIENT)
Age: 76
End: 2021-01-06

## 2021-01-07 LAB
DEPRECATED S PNEUM 1 IGG SER-MCNC: NORMAL
DEPRECATED S PNEUM12 AB SER-ACNC: NORMAL
DEPRECATED S PNEUM14 AB SER-ACNC: NORMAL
DEPRECATED S PNEUM17 IGG SER IA-MCNC: NORMAL
DEPRECATED S PNEUM18 IGG SER IA-MCNC: NORMAL
DEPRECATED S PNEUM19 IGG SER-MCNC: NORMAL
DEPRECATED S PNEUM19 IGG SER-MCNC: NORMAL
DEPRECATED S PNEUM2 IGG SER-MCNC: NORMAL
DEPRECATED S PNEUM20 IGG SER-MCNC: NORMAL
DEPRECATED S PNEUM22 IGG SER-MCNC: NORMAL
DEPRECATED S PNEUM23 AB SER-ACNC: NORMAL
DEPRECATED S PNEUM3 AB SER-ACNC: NORMAL
DEPRECATED S PNEUM34 IGG SER-MCNC: NORMAL
DEPRECATED S PNEUM4 AB SER-ACNC: NORMAL
DEPRECATED S PNEUM6 IGG SER-MCNC: NORMAL
DEPRECATED S PNEUM7 IGG SER-ACNC: NORMAL
DEPRECATED S PNEUM8 AB SER-ACNC: NORMAL
DEPRECATED S PNEUM9 IGG SER-MCNC: NORMAL
STREPTOCOCCUS PNEUMONIAE SEROTYPE 11A: NORMAL
STREPTOCOCCUS PNEUMONIAE SEROTYPE 15B: NORMAL
STREPTOCOCCUS PNEUMONIAE SEROTYPE 33F: NORMAL

## 2021-02-02 ENCOUNTER — OUTPATIENT (OUTPATIENT)
Dept: OUTPATIENT SERVICES | Facility: HOSPITAL | Age: 76
LOS: 1 days | Discharge: HOME | End: 2021-02-02

## 2021-02-02 DIAGNOSIS — D68.2 HEREDITARY DEFICIENCY OF OTHER CLOTTING FACTORS: ICD-10-CM

## 2021-02-03 ENCOUNTER — APPOINTMENT (OUTPATIENT)
Dept: PEDIATRIC ALLERGY IMMUNOLOGY | Facility: CLINIC | Age: 76
End: 2021-02-03

## 2021-02-06 ENCOUNTER — LABORATORY RESULT (OUTPATIENT)
Age: 76
End: 2021-02-06

## 2021-02-08 ENCOUNTER — APPOINTMENT (OUTPATIENT)
Dept: HEMATOLOGY ONCOLOGY | Facility: CLINIC | Age: 76
End: 2021-02-08
Payer: MEDICARE

## 2021-02-08 DIAGNOSIS — Z80.52 FAMILY HISTORY OF MALIGNANT NEOPLASM OF BLADDER: ICD-10-CM

## 2021-02-08 DIAGNOSIS — R06.02 SHORTNESS OF BREATH: ICD-10-CM

## 2021-02-08 DIAGNOSIS — Z82.3 FAMILY HISTORY OF STROKE: ICD-10-CM

## 2021-02-08 DIAGNOSIS — Z83.3 FAMILY HISTORY OF DIABETES MELLITUS: ICD-10-CM

## 2021-02-08 PROCEDURE — 99215 OFFICE O/P EST HI 40 MIN: CPT | Mod: 95

## 2021-02-08 RX ORDER — OXYCODONE 5 MG/1
5 TABLET ORAL
Qty: 20 | Refills: 0 | Status: DISCONTINUED | COMMUNITY
Start: 2020-12-15 | End: 2021-02-08

## 2021-02-11 PROBLEM — R06.02 SOB (SHORTNESS OF BREATH): Status: ACTIVE | Noted: 2017-04-13

## 2021-02-11 PROBLEM — Z82.3 FAMILY HISTORY OF TRANSIENT ISCHEMIC ATTACKS: Status: ACTIVE | Noted: 2017-04-13

## 2021-02-11 NOTE — REASON FOR VISIT
[Home] : at home, [unfilled] , at the time of the visit. [Medical Office: ([facility])___] : at the medical office located in  [Verbal consent obtained from patient] : the patient, [unfilled] [Initial Consultation] : an initial consultation for [Blood Count Assessment] : blood count assessment [Family Member] : family member [Other: _____] : [unfilled]

## 2021-02-11 NOTE — HISTORY OF PRESENT ILLNESS
[Disease:__________________________] : Disease: [unfilled] [Date: ____________] : Patient's last distress assessment performed on [unfilled]. [de-identified] : Ms. 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  Splenomegaly was an incidental finding on a chest CT from 8/2020 which demonstrated bilateral groundglass infiltrates.  She is being followed by Dr. Anne for this.  Abdominal ultrasound from 11/5/2020 showed the spleen measuring 14.5 cm.  A 0.9 cm splenule was seen.  PET/CT showed the mild splenomegaly as well as diffuse low-level SUV positivity.  Pulmonary uptake was not described.  A tiny RUL lung nodule was seen which was too small to characterize. \par Bone marrow aspirate and biopsy on 9/30/2020 showed a hypercellular marrow involved by a CD5 and CD10 negative low-grade B-cell lymphoma, which made up about 10% of marrow cells.  By flow, there was a small (2.5%) monotypic B-cell population expressing kappa, CD19, CD20, CD22, CD11c (dim), FMC 7, minimal CD25 and negative for CD5, CD10, CD23 and .  CLL FISH panel was negative, including TP53.  FISH for BIRC3/MALT1 was also negative.\par CBC, differential and platelets have been for the most part normal.  Beta-2 MG was 2.7 on 10/6/2020.  CMP at that time was normal.  LDH was normal. \par Her major complaints are GI related: significant reflux sxs as well as early satiety.\par She has no constitutional complaints.\par  [FreeTextEntry1] : I am here for the evaluation of spleen enlargement [de-identified] : IMarginal zone lymphoma,  lung involvement, (+) marrow\par Increased MEJIA last couple of months\par No constitutional c/o\par Significant hypogamma, no recent infections\par \par CT chest - increase in ILD 11/23, CT last week slightly worse ILD\par Left robotic VATS with HUNG and LLL wedge resections: MZL in lower lobe, non specific upper lobe inflammatory changes

## 2021-02-11 NOTE — REVIEW OF SYSTEMS
[Fatigue] : fatigue [Vision Problems] : vision problems [Cough] : cough [SOB on Exertion] : shortness of breath during exertion [Joint Pain] : joint pain [Insomnia] : insomnia [Shortness Of Breath] : shortness of breath [Easy Bleeding] : a tendency for easy bleeding [Negative] : Psychiatric [Fever] : no fever [Chills] : no chills [Night Sweats] : no night sweats [Recent Change In Weight] : ~T no recent weight change [Eye Pain] : no eye pain [Red Eyes] : eyes not red [Dry Eyes] : no dryness of the eyes [Dysphagia] : no dysphagia [Loss of Hearing] : no loss of hearing [Nosebleeds] : no nosebleeds [Hoarseness] : no hoarseness [Odynophagia] : no odynophagia [Mucosal Pain] : no mucosal pain [Chest Pain] : no chest pain [Palpitations] : no palpitations [Leg Claudication] : no intermittent leg claudication [Lower Ext Edema] : no lower extremity edema [Wheezing] : no wheezing [Abdominal Pain] : no abdominal pain [Vomiting] : no vomiting [Constipation] : no constipation [Diarrhea] : no diarrhea [Dysuria] : no dysuria [Incontinence] : no incontinence [Joint Stiffness] : no joint stiffness [Muscle Pain] : no muscle pain [Muscle Weakness] : no muscle weakness [Skin Rash] : no skin rash [Skin Wound] : no skin wound [Confused] : no confusion [Dizziness] : no dizziness [Fainting] : no fainting [Difficulty Walking] : no difficulty walking [Suicidal] : not suicidal [Anxiety] : no anxiety [Depression] : no depression [Easy Bruising] : no tendency for easy bruising [Swollen Glands] : no swollen glands [FreeTextEntry2] : related to sleeping [FreeTextEntry9] : cervical spine pain [de-identified] : prediabetic [FreeTextEntry1] : hypogamma

## 2021-02-11 NOTE — ASSESSMENT
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, spleen, marrow.\par Moderately symptomatic with MEJIA; lung dz due to lymphoma, ILD, both could be contributing to pulm c/o\par Continued POD seen in basilar infiltrates\par No further increase noted in spleen size\par Therapy delayed until EOD clarified, tempo of dz progression checked\par Also therapy delayed until completes Covid vaccination\par Not clear if she will respond to vaccine\par Conflicting opinions re prophylactic IVIG - saw two immunologists, one recommends pre emptive use even with several yrs w/o serious sinopulmonary infections, another recommends using IVIG only f infectious problems recur\par Heme practice has been similar to second opinion - e.g. hold on IVIG for now\par 2-3 wks post 2nd Covid vaccine, will begin minimal therapy to assess effect on pulm c/o and pulm imaging findings\par Will give rituximab 375 mg/sq m weekly x 4\par Risks, benefits, potential complications of therapy reviewed and all questions were answered\par \par

## 2021-02-11 NOTE — CONSULT LETTER
[Dear  ___] : Dear  [unfilled], [Consult Letter:] : I had the pleasure of evaluating your patient, [unfilled]. [Please see my note below.] : Please see my note below. [Consult Closing:] : Thank you very much for allowing me to participate in the care of this patient.  If you have any questions, please do not hesitate to contact me. [Sincerely,] : Sincerely, [DrJuan  ___] : Dr. ANDRES [DrJuan ___] : Dr. ANDRES [FreeTextEntry2] : Flores Jesus M.D.\par

## 2021-02-19 ENCOUNTER — RESULT REVIEW (OUTPATIENT)
Age: 76
End: 2021-02-19

## 2021-02-19 ENCOUNTER — APPOINTMENT (OUTPATIENT)
Dept: HEMATOLOGY ONCOLOGY | Facility: CLINIC | Age: 76
End: 2021-02-19
Payer: MEDICARE

## 2021-02-19 VITALS
DIASTOLIC BLOOD PRESSURE: 74 MMHG | HEART RATE: 65 BPM | TEMPERATURE: 97 F | HEIGHT: 60.71 IN | BODY MASS INDEX: 22.77 KG/M2 | WEIGHT: 119.05 LBS | RESPIRATION RATE: 16 BRPM | SYSTOLIC BLOOD PRESSURE: 147 MMHG | OXYGEN SATURATION: 97 %

## 2021-02-19 DIAGNOSIS — Z72.820 SLEEP DEPRIVATION: ICD-10-CM

## 2021-02-19 LAB
BASOPHILS # BLD AUTO: 0.02 K/UL — SIGNIFICANT CHANGE UP (ref 0–0.2)
BASOPHILS NFR BLD AUTO: 0.4 % — SIGNIFICANT CHANGE UP (ref 0–2)
EOSINOPHIL # BLD AUTO: 0.02 K/UL — SIGNIFICANT CHANGE UP (ref 0–0.5)
EOSINOPHIL NFR BLD AUTO: 0.4 % — SIGNIFICANT CHANGE UP (ref 0–6)
HCT VFR BLD CALC: 41.1 % — SIGNIFICANT CHANGE UP (ref 34.5–45)
HGB BLD-MCNC: 13.8 G/DL — SIGNIFICANT CHANGE UP (ref 11.5–15.5)
IMM GRANULOCYTES NFR BLD AUTO: 0.6 % — SIGNIFICANT CHANGE UP (ref 0–1.5)
LYMPHOCYTES # BLD AUTO: 1.82 K/UL — SIGNIFICANT CHANGE UP (ref 1–3.3)
LYMPHOCYTES # BLD AUTO: 35.3 % — SIGNIFICANT CHANGE UP (ref 13–44)
MCHC RBC-ENTMCNC: 31.1 PG — SIGNIFICANT CHANGE UP (ref 27–34)
MCHC RBC-ENTMCNC: 33.6 G/DL — SIGNIFICANT CHANGE UP (ref 32–36)
MCV RBC AUTO: 92.6 FL — SIGNIFICANT CHANGE UP (ref 80–100)
MONOCYTES # BLD AUTO: 0.45 K/UL — SIGNIFICANT CHANGE UP (ref 0–0.9)
MONOCYTES NFR BLD AUTO: 8.7 % — SIGNIFICANT CHANGE UP (ref 2–14)
NEUTROPHILS # BLD AUTO: 2.81 K/UL — SIGNIFICANT CHANGE UP (ref 1.8–7.4)
NEUTROPHILS NFR BLD AUTO: 54.6 % — SIGNIFICANT CHANGE UP (ref 43–77)
NRBC # BLD: 0 /100 WBCS — SIGNIFICANT CHANGE UP (ref 0–0)
PLATELET # BLD AUTO: 229 K/UL — SIGNIFICANT CHANGE UP (ref 150–400)
RBC # BLD: 4.44 M/UL — SIGNIFICANT CHANGE UP (ref 3.8–5.2)
RBC # FLD: 12.8 % — SIGNIFICANT CHANGE UP (ref 10.3–14.5)
WBC # BLD: 5.15 K/UL — SIGNIFICANT CHANGE UP (ref 3.8–10.5)
WBC # FLD AUTO: 5.15 K/UL — SIGNIFICANT CHANGE UP (ref 3.8–10.5)

## 2021-02-19 PROCEDURE — 99215 OFFICE O/P EST HI 40 MIN: CPT

## 2021-02-19 PROCEDURE — 93000 ELECTROCARDIOGRAM COMPLETE: CPT

## 2021-02-19 RX ORDER — PNV NO.95/FERROUS FUM/FOLIC AC 28MG-0.8MG
TABLET ORAL
Refills: 0 | Status: ACTIVE | COMMUNITY

## 2021-02-19 RX ORDER — CYANOCOBALAMIN (VITAMIN B-12) 500 MCG
400 LOZENGE ORAL
Refills: 0 | Status: ACTIVE | COMMUNITY

## 2021-02-19 RX ORDER — B-COMPLEX WITH VITAMIN C
TABLET ORAL
Refills: 0 | Status: ACTIVE | COMMUNITY

## 2021-02-19 RX ORDER — TRAZODONE HYDROCHLORIDE 150 MG/1
150 TABLET ORAL
Qty: 30 | Refills: 1 | Status: COMPLETED | COMMUNITY
Start: 2018-10-05 | End: 2021-02-19

## 2021-02-21 NOTE — ASSESSMENT
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, spleen, marrow.\par Remains `oderately symptomatic with MEJIA; lung dz due to lymphoma, ILD, both could be contributing to pulm c/o\par Continued POD seen in basilar infiltrates\par No further increase noted in spleen size\par Therapy had been delayed until EOD clarified, tempo of dz progression checked\par Also therapy delayed until completed Covid vaccination, which she did about 2 wks ago\par Not clear if she will respond to vaccine\par Conflicting opinions re prophylactic IVIG - saw two immunologists, one recommends pre emptive use even with several yrs w/o serious sinopulmonary infections, another recommends using IVIG only if infectious problems recur\par Heme practice has been similar to second opinion - e.g. hold on IVIG for now\par IVIG therapy reviewed again and we agree to hold off for now\par Treatment with rituxan reviewed again with pt and two dtrs, including risk, potential benefits and toxicities and all questions were answered\par Signed, informed consent obtained\par Will give rituximab 375 mg/sq m weekly x 4 starting next week\par \par \par

## 2021-02-21 NOTE — CONSULT LETTER
[Dear  ___] : Dear  [unfilled], [Consult Letter:] : I had the pleasure of evaluating your patient, [unfilled]. [Please see my note below.] : Please see my note below. [Sincerely,] : Sincerely, [Consult Closing:] : Thank you very much for allowing me to participate in the care of this patient.  If you have any questions, please do not hesitate to contact me. [FreeTextEntry2] : Flores Jesus M.D.\par  [DrJuan  ___] : Dr. ANDRES [DrJuan ___] : Dr. ANDRES

## 2021-02-21 NOTE — REVIEW OF SYSTEMS
[Fatigue] : fatigue [SOB on Exertion] : shortness of breath during exertion [Fever] : no fever [Chills] : no chills [Night Sweats] : no night sweats [Recent Change In Weight] : ~T no recent weight change [Eye Pain] : no eye pain [Red Eyes] : eyes not red [Dry Eyes] : no dryness of the eyes [Vision Problems] : vision problems [Dysphagia] : no dysphagia [Loss of Hearing] : no loss of hearing [Nosebleeds] : no nosebleeds [Hoarseness] : no hoarseness [Odynophagia] : no odynophagia [Mucosal Pain] : no mucosal pain [Chest Pain] : no chest pain [Palpitations] : no palpitations [Leg Claudication] : no intermittent leg claudication [Lower Ext Edema] : no lower extremity edema [Shortness Of Breath] : shortness of breath [Wheezing] : no wheezing [Cough] : cough [Abdominal Pain] : no abdominal pain [Vomiting] : no vomiting [Constipation] : no constipation [Diarrhea] : no diarrhea [Dysuria] : no dysuria [Incontinence] : no incontinence [Joint Pain] : joint pain [Joint Stiffness] : no joint stiffness [Muscle Pain] : no muscle pain [Muscle Weakness] : no muscle weakness [Skin Rash] : no skin rash [Skin Wound] : no skin wound [Confused] : no confusion [Dizziness] : no dizziness [Fainting] : no fainting [Difficulty Walking] : no difficulty walking [Suicidal] : not suicidal [Anxiety] : no anxiety [Insomnia] : insomnia [Depression] : no depression [Easy Bleeding] : a tendency for easy bleeding [Easy Bruising] : no tendency for easy bruising [Swollen Glands] : no swollen glands [Negative] : Psychiatric [FreeTextEntry2] : related to sleeping [FreeTextEntry3] : limited vision RT eye  [FreeTextEntry8] : chlamydia 10 years ago   [FreeTextEntry7] : Acid reflux up to date colonscopy 3/2019 with endoscopy  [FreeTextEntry9] : cervical spine pain [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

## 2021-02-21 NOTE — HISTORY OF PRESENT ILLNESS
[Disease:__________________________] : Disease: [unfilled] [Treatment Protocol] : Treatment Protocol [de-identified] : Ms. 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  Splenomegaly was an incidental finding on a chest CT from 8/2020 which demonstrated bilateral groundglass infiltrates.  She is being followed by Dr. Anne for this.  Abdominal ultrasound from 11/5/2020 showed the spleen measuring 14.5 cm.  A 0.9 cm splenule was seen.  PET/CT showed the mild splenomegaly as well as diffuse low-level SUV positivity.  Pulmonary uptake was not described.  A tiny RUL lung nodule was seen which was too small to characterize. \par Bone marrow aspirate and biopsy on 9/30/2020 showed a hypercellular marrow involved by a CD5 and CD10 negative low-grade B-cell lymphoma, which made up about 10% of marrow cells.  By flow, there was a small (2.5%) monotypic B-cell population expressing kappa, CD19, CD20, CD22, CD11c (dim), FMC 7, minimal CD25 and negative for CD5, CD10, CD23 and .  CLL FISH panel was negative, including TP53.  FISH for BIRC3/MALT1 was also negative.\par CBC, differential and platelets have been for the most part normal.  Beta-2 MG was 2.7 on 10/6/2020.  CMP at that time was normal.  LDH was normal. \par Her major complaints are GI related: significant reflux sxs as well as early satiety.\par She has no constitutional complaints.\par  [FreeTextEntry1] : I am here for the evaluation of spleen enlargement [de-identified] : Marginal zone lymphoma,  lung involvement, (+) marrow\par Increased MEJIA last couple of months, stable since last visit\par No constitutional c/o\par Significant hypogamma, no recent infections\par \par CT chest - increase in ILD 11/23, CT last week slightly worse ILD\par Left robotic VATS with HUNG and LLL wedge resections: MZL in lower lobe, non specific upper lobe inflammatory changes [Date: ____________] : Patient's last distress assessment performed on [unfilled].

## 2021-02-26 ENCOUNTER — LABORATORY RESULT (OUTPATIENT)
Age: 76
End: 2021-02-26

## 2021-02-26 ENCOUNTER — APPOINTMENT (OUTPATIENT)
Dept: HEMATOLOGY ONCOLOGY | Facility: CLINIC | Age: 76
End: 2021-02-26
Payer: MEDICARE

## 2021-02-26 ENCOUNTER — APPOINTMENT (OUTPATIENT)
Dept: INFUSION THERAPY | Facility: HOSPITAL | Age: 76
End: 2021-02-26

## 2021-02-26 ENCOUNTER — RESULT REVIEW (OUTPATIENT)
Age: 76
End: 2021-02-26

## 2021-02-26 DIAGNOSIS — T80.90XA UNSPECIFIED COMPLICATION FOLLOWING INFUSION AND THERAPEUTIC INJECTION, INITIAL ENCOUNTER: ICD-10-CM

## 2021-02-26 LAB
BASOPHILS # BLD AUTO: 0.01 K/UL — SIGNIFICANT CHANGE UP (ref 0–0.2)
BASOPHILS NFR BLD AUTO: 0.2 % — SIGNIFICANT CHANGE UP (ref 0–2)
EOSINOPHIL # BLD AUTO: 0.03 K/UL — SIGNIFICANT CHANGE UP (ref 0–0.5)
EOSINOPHIL NFR BLD AUTO: 0.6 % — SIGNIFICANT CHANGE UP (ref 0–6)
HCT VFR BLD CALC: 38.2 % — SIGNIFICANT CHANGE UP (ref 34.5–45)
HGB BLD-MCNC: 12.9 G/DL — SIGNIFICANT CHANGE UP (ref 11.5–15.5)
IMM GRANULOCYTES NFR BLD AUTO: 1 % — SIGNIFICANT CHANGE UP (ref 0–1.5)
LYMPHOCYTES # BLD AUTO: 1.52 K/UL — SIGNIFICANT CHANGE UP (ref 1–3.3)
LYMPHOCYTES # BLD AUTO: 29.7 % — SIGNIFICANT CHANGE UP (ref 13–44)
MCHC RBC-ENTMCNC: 31.5 PG — SIGNIFICANT CHANGE UP (ref 27–34)
MCHC RBC-ENTMCNC: 33.8 G/DL — SIGNIFICANT CHANGE UP (ref 32–36)
MCV RBC AUTO: 93.4 FL — SIGNIFICANT CHANGE UP (ref 80–100)
MONOCYTES # BLD AUTO: 0.49 K/UL — SIGNIFICANT CHANGE UP (ref 0–0.9)
MONOCYTES NFR BLD AUTO: 9.6 % — SIGNIFICANT CHANGE UP (ref 2–14)
NEUTROPHILS # BLD AUTO: 3.01 K/UL — SIGNIFICANT CHANGE UP (ref 1.8–7.4)
NEUTROPHILS NFR BLD AUTO: 58.9 % — SIGNIFICANT CHANGE UP (ref 43–77)
NRBC # BLD: 0 /100 WBCS — SIGNIFICANT CHANGE UP (ref 0–0)
PLATELET # BLD AUTO: 204 K/UL — SIGNIFICANT CHANGE UP (ref 150–400)
RBC # BLD: 4.09 M/UL — SIGNIFICANT CHANGE UP (ref 3.8–5.2)
RBC # FLD: 12.7 % — SIGNIFICANT CHANGE UP (ref 10.3–14.5)
WBC # BLD: 5.11 K/UL — SIGNIFICANT CHANGE UP (ref 3.8–10.5)
WBC # FLD AUTO: 5.11 K/UL — SIGNIFICANT CHANGE UP (ref 3.8–10.5)

## 2021-02-26 PROCEDURE — 99214 OFFICE O/P EST MOD 30 MIN: CPT

## 2021-03-01 ENCOUNTER — NON-APPOINTMENT (OUTPATIENT)
Age: 76
End: 2021-03-01

## 2021-03-01 ENCOUNTER — OUTPATIENT (OUTPATIENT)
Dept: OUTPATIENT SERVICES | Facility: HOSPITAL | Age: 76
LOS: 1 days | Discharge: HOME | End: 2021-03-01

## 2021-03-01 DIAGNOSIS — C85.80 OTHER SPECIFIED TYPES OF NON-HODGKIN LYMPHOMA, UNSPECIFIED SITE: ICD-10-CM

## 2021-03-01 DIAGNOSIS — Z51.11 ENCOUNTER FOR ANTINEOPLASTIC CHEMOTHERAPY: ICD-10-CM

## 2021-03-01 DIAGNOSIS — R11.2 NAUSEA WITH VOMITING, UNSPECIFIED: ICD-10-CM

## 2021-03-02 ENCOUNTER — NON-APPOINTMENT (OUTPATIENT)
Age: 76
End: 2021-03-02

## 2021-03-05 ENCOUNTER — APPOINTMENT (OUTPATIENT)
Dept: INFUSION THERAPY | Facility: HOSPITAL | Age: 76
End: 2021-03-05

## 2021-03-05 ENCOUNTER — RESULT REVIEW (OUTPATIENT)
Age: 76
End: 2021-03-05

## 2021-03-05 ENCOUNTER — LABORATORY RESULT (OUTPATIENT)
Age: 76
End: 2021-03-05

## 2021-03-05 PROBLEM — T80.90XA INFUSION REACTION, INITIAL ENCOUNTER: Status: ACTIVE | Noted: 2021-03-05

## 2021-03-05 LAB
BASOPHILS # BLD AUTO: 0.04 K/UL — SIGNIFICANT CHANGE UP (ref 0–0.2)
BASOPHILS NFR BLD AUTO: 0.6 % — SIGNIFICANT CHANGE UP (ref 0–2)
EOSINOPHIL # BLD AUTO: 0.05 K/UL — SIGNIFICANT CHANGE UP (ref 0–0.5)
EOSINOPHIL NFR BLD AUTO: 0.8 % — SIGNIFICANT CHANGE UP (ref 0–6)
HCT VFR BLD CALC: 40.3 % — SIGNIFICANT CHANGE UP (ref 34.5–45)
HGB BLD-MCNC: 13.7 G/DL — SIGNIFICANT CHANGE UP (ref 11.5–15.5)
IMM GRANULOCYTES NFR BLD AUTO: 0.5 % — SIGNIFICANT CHANGE UP (ref 0–1.5)
LYMPHOCYTES # BLD AUTO: 1.65 K/UL — SIGNIFICANT CHANGE UP (ref 1–3.3)
LYMPHOCYTES # BLD AUTO: 26.3 % — SIGNIFICANT CHANGE UP (ref 13–44)
MCHC RBC-ENTMCNC: 31.4 PG — SIGNIFICANT CHANGE UP (ref 27–34)
MCHC RBC-ENTMCNC: 34 G/DL — SIGNIFICANT CHANGE UP (ref 32–36)
MCV RBC AUTO: 92.2 FL — SIGNIFICANT CHANGE UP (ref 80–100)
MONOCYTES # BLD AUTO: 0.6 K/UL — SIGNIFICANT CHANGE UP (ref 0–0.9)
MONOCYTES NFR BLD AUTO: 9.6 % — SIGNIFICANT CHANGE UP (ref 2–14)
NEUTROPHILS # BLD AUTO: 3.91 K/UL — SIGNIFICANT CHANGE UP (ref 1.8–7.4)
NEUTROPHILS NFR BLD AUTO: 62.2 % — SIGNIFICANT CHANGE UP (ref 43–77)
NRBC # BLD: 0 /100 WBCS — SIGNIFICANT CHANGE UP (ref 0–0)
PLATELET # BLD AUTO: 220 K/UL — SIGNIFICANT CHANGE UP (ref 150–400)
RBC # BLD: 4.37 M/UL — SIGNIFICANT CHANGE UP (ref 3.8–5.2)
RBC # FLD: 13 % — SIGNIFICANT CHANGE UP (ref 10.3–14.5)
WBC # BLD: 6.28 K/UL — SIGNIFICANT CHANGE UP (ref 3.8–10.5)
WBC # FLD AUTO: 6.28 K/UL — SIGNIFICANT CHANGE UP (ref 3.8–10.5)

## 2021-03-08 DIAGNOSIS — R11.2 NAUSEA WITH VOMITING, UNSPECIFIED: ICD-10-CM

## 2021-03-08 DIAGNOSIS — Z51.11 ENCOUNTER FOR ANTINEOPLASTIC CHEMOTHERAPY: ICD-10-CM

## 2021-03-10 ENCOUNTER — NON-APPOINTMENT (OUTPATIENT)
Age: 76
End: 2021-03-10

## 2021-03-11 LAB — SARS-COV-2 N GENE NPH QL NAA+PROBE: NOT DETECTED

## 2021-03-12 ENCOUNTER — RESULT REVIEW (OUTPATIENT)
Age: 76
End: 2021-03-12

## 2021-03-12 ENCOUNTER — APPOINTMENT (OUTPATIENT)
Dept: INFUSION THERAPY | Facility: HOSPITAL | Age: 76
End: 2021-03-12

## 2021-03-12 ENCOUNTER — LABORATORY RESULT (OUTPATIENT)
Age: 76
End: 2021-03-12

## 2021-03-12 LAB
BASOPHILS # BLD AUTO: 0.08 K/UL — SIGNIFICANT CHANGE UP (ref 0–0.2)
BASOPHILS NFR BLD AUTO: 1 % — SIGNIFICANT CHANGE UP (ref 0–2)
EOSINOPHIL # BLD AUTO: 0.05 K/UL — SIGNIFICANT CHANGE UP (ref 0–0.5)
EOSINOPHIL NFR BLD AUTO: 0.6 % — SIGNIFICANT CHANGE UP (ref 0–6)
HCT VFR BLD CALC: 40.6 % — SIGNIFICANT CHANGE UP (ref 34.5–45)
HGB BLD-MCNC: 14 G/DL — SIGNIFICANT CHANGE UP (ref 11.5–15.5)
IMM GRANULOCYTES NFR BLD AUTO: 3.8 % — HIGH (ref 0–1.5)
LYMPHOCYTES # BLD AUTO: 1.94 K/UL — SIGNIFICANT CHANGE UP (ref 1–3.3)
LYMPHOCYTES # BLD AUTO: 25.2 % — SIGNIFICANT CHANGE UP (ref 13–44)
MCHC RBC-ENTMCNC: 31.5 PG — SIGNIFICANT CHANGE UP (ref 27–34)
MCHC RBC-ENTMCNC: 34.5 G/DL — SIGNIFICANT CHANGE UP (ref 32–36)
MCV RBC AUTO: 91.4 FL — SIGNIFICANT CHANGE UP (ref 80–100)
MONOCYTES # BLD AUTO: 0.49 K/UL — SIGNIFICANT CHANGE UP (ref 0–0.9)
MONOCYTES NFR BLD AUTO: 6.4 % — SIGNIFICANT CHANGE UP (ref 2–14)
NEUTROPHILS # BLD AUTO: 4.85 K/UL — SIGNIFICANT CHANGE UP (ref 1.8–7.4)
NEUTROPHILS NFR BLD AUTO: 63 % — SIGNIFICANT CHANGE UP (ref 43–77)
NRBC # BLD: 0 /100 WBCS — SIGNIFICANT CHANGE UP (ref 0–0)
PLATELET # BLD AUTO: 246 K/UL — SIGNIFICANT CHANGE UP (ref 150–400)
RBC # BLD: 4.44 M/UL — SIGNIFICANT CHANGE UP (ref 3.8–5.2)
RBC # FLD: 13.2 % — SIGNIFICANT CHANGE UP (ref 10.3–14.5)
WBC # BLD: 7.7 K/UL — SIGNIFICANT CHANGE UP (ref 3.8–10.5)
WBC # FLD AUTO: 7.7 K/UL — SIGNIFICANT CHANGE UP (ref 3.8–10.5)

## 2021-03-16 ENCOUNTER — APPOINTMENT (OUTPATIENT)
Dept: UROLOGY | Facility: CLINIC | Age: 76
End: 2021-03-16

## 2021-03-19 ENCOUNTER — RESULT REVIEW (OUTPATIENT)
Age: 76
End: 2021-03-19

## 2021-03-19 ENCOUNTER — LABORATORY RESULT (OUTPATIENT)
Age: 76
End: 2021-03-19

## 2021-03-19 ENCOUNTER — APPOINTMENT (OUTPATIENT)
Dept: INFUSION THERAPY | Facility: HOSPITAL | Age: 76
End: 2021-03-19
Payer: MEDICARE

## 2021-03-19 DIAGNOSIS — G89.29 DORSALGIA, UNSPECIFIED: ICD-10-CM

## 2021-03-19 DIAGNOSIS — M54.9 DORSALGIA, UNSPECIFIED: ICD-10-CM

## 2021-03-19 LAB
BASOPHILS # BLD AUTO: 0.02 K/UL — SIGNIFICANT CHANGE UP (ref 0–0.2)
BASOPHILS NFR BLD AUTO: 0.3 % — SIGNIFICANT CHANGE UP (ref 0–2)
EOSINOPHIL # BLD AUTO: 0.05 K/UL — SIGNIFICANT CHANGE UP (ref 0–0.5)
EOSINOPHIL NFR BLD AUTO: 0.8 % — SIGNIFICANT CHANGE UP (ref 0–6)
HCT VFR BLD CALC: 38.5 % — SIGNIFICANT CHANGE UP (ref 34.5–45)
HGB BLD-MCNC: 13.3 G/DL — SIGNIFICANT CHANGE UP (ref 11.5–15.5)
IMM GRANULOCYTES NFR BLD AUTO: 0.7 % — SIGNIFICANT CHANGE UP (ref 0–1.5)
LYMPHOCYTES # BLD AUTO: 1.44 K/UL — SIGNIFICANT CHANGE UP (ref 1–3.3)
LYMPHOCYTES # BLD AUTO: 24 % — SIGNIFICANT CHANGE UP (ref 13–44)
MCHC RBC-ENTMCNC: 31.7 PG — SIGNIFICANT CHANGE UP (ref 27–34)
MCHC RBC-ENTMCNC: 34.5 G/DL — SIGNIFICANT CHANGE UP (ref 32–36)
MCV RBC AUTO: 91.9 FL — SIGNIFICANT CHANGE UP (ref 80–100)
MONOCYTES # BLD AUTO: 0.53 K/UL — SIGNIFICANT CHANGE UP (ref 0–0.9)
MONOCYTES NFR BLD AUTO: 8.8 % — SIGNIFICANT CHANGE UP (ref 2–14)
NEUTROPHILS # BLD AUTO: 3.91 K/UL — SIGNIFICANT CHANGE UP (ref 1.8–7.4)
NEUTROPHILS NFR BLD AUTO: 65.4 % — SIGNIFICANT CHANGE UP (ref 43–77)
NRBC # BLD: 0 /100 WBCS — SIGNIFICANT CHANGE UP (ref 0–0)
PLATELET # BLD AUTO: 247 K/UL — SIGNIFICANT CHANGE UP (ref 150–400)
RBC # BLD: 4.19 M/UL — SIGNIFICANT CHANGE UP (ref 3.8–5.2)
RBC # FLD: 13.2 % — SIGNIFICANT CHANGE UP (ref 10.3–14.5)
WBC # BLD: 5.99 K/UL — SIGNIFICANT CHANGE UP (ref 3.8–10.5)
WBC # FLD AUTO: 5.99 K/UL — SIGNIFICANT CHANGE UP (ref 3.8–10.5)

## 2021-03-19 PROCEDURE — 99213 OFFICE O/P EST LOW 20 MIN: CPT

## 2021-03-25 ENCOUNTER — NON-APPOINTMENT (OUTPATIENT)
Age: 76
End: 2021-03-25

## 2021-04-07 ENCOUNTER — OUTPATIENT (OUTPATIENT)
Dept: OUTPATIENT SERVICES | Facility: HOSPITAL | Age: 76
LOS: 1 days | Discharge: HOME | End: 2021-04-07

## 2021-04-07 DIAGNOSIS — C85.88 OTHER SPECIFIED TYPES OF NON-HODGKIN LYMPHOMA, LYMPH NODES OF MULTIPLE SITES: ICD-10-CM

## 2021-04-12 ENCOUNTER — RESULT REVIEW (OUTPATIENT)
Age: 76
End: 2021-04-12

## 2021-04-12 ENCOUNTER — APPOINTMENT (OUTPATIENT)
Dept: HEMATOLOGY ONCOLOGY | Facility: CLINIC | Age: 76
End: 2021-04-12
Payer: MEDICARE

## 2021-04-12 VITALS
BODY MASS INDEX: 22.89 KG/M2 | TEMPERATURE: 98.8 F | OXYGEN SATURATION: 95 % | DIASTOLIC BLOOD PRESSURE: 67 MMHG | RESPIRATION RATE: 14 BRPM | HEIGHT: 61.1 IN | WEIGHT: 121.23 LBS | HEART RATE: 71 BPM | SYSTOLIC BLOOD PRESSURE: 131 MMHG

## 2021-04-12 LAB
BASOPHILS # BLD AUTO: 0.03 K/UL — SIGNIFICANT CHANGE UP (ref 0–0.2)
BASOPHILS NFR BLD AUTO: 0.4 % — SIGNIFICANT CHANGE UP (ref 0–2)
EOSINOPHIL # BLD AUTO: 0.11 K/UL — SIGNIFICANT CHANGE UP (ref 0–0.5)
EOSINOPHIL NFR BLD AUTO: 1.6 % — SIGNIFICANT CHANGE UP (ref 0–6)
HCT VFR BLD CALC: 36.2 % — SIGNIFICANT CHANGE UP (ref 34.5–45)
HGB BLD-MCNC: 12.4 G/DL — SIGNIFICANT CHANGE UP (ref 11.5–15.5)
IMM GRANULOCYTES NFR BLD AUTO: 0.3 % — SIGNIFICANT CHANGE UP (ref 0–1.5)
LYMPHOCYTES # BLD AUTO: 1.54 K/UL — SIGNIFICANT CHANGE UP (ref 1–3.3)
LYMPHOCYTES # BLD AUTO: 23 % — SIGNIFICANT CHANGE UP (ref 13–44)
MCHC RBC-ENTMCNC: 32.5 PG — SIGNIFICANT CHANGE UP (ref 27–34)
MCHC RBC-ENTMCNC: 34.3 G/DL — SIGNIFICANT CHANGE UP (ref 32–36)
MCV RBC AUTO: 94.8 FL — SIGNIFICANT CHANGE UP (ref 80–100)
MONOCYTES # BLD AUTO: 0.63 K/UL — SIGNIFICANT CHANGE UP (ref 0–0.9)
MONOCYTES NFR BLD AUTO: 9.4 % — SIGNIFICANT CHANGE UP (ref 2–14)
NEUTROPHILS # BLD AUTO: 4.37 K/UL — SIGNIFICANT CHANGE UP (ref 1.8–7.4)
NEUTROPHILS NFR BLD AUTO: 65.3 % — SIGNIFICANT CHANGE UP (ref 43–77)
NRBC # BLD: 0 /100 WBCS — SIGNIFICANT CHANGE UP (ref 0–0)
PLATELET # BLD AUTO: 323 K/UL — SIGNIFICANT CHANGE UP (ref 150–400)
RBC # BLD: 3.82 M/UL — SIGNIFICANT CHANGE UP (ref 3.8–5.2)
RBC # FLD: 14.5 % — SIGNIFICANT CHANGE UP (ref 10.3–14.5)
WBC # BLD: 6.7 K/UL — SIGNIFICANT CHANGE UP (ref 3.8–10.5)
WBC # FLD AUTO: 6.7 K/UL — SIGNIFICANT CHANGE UP (ref 3.8–10.5)

## 2021-04-12 PROCEDURE — 99214 OFFICE O/P EST MOD 30 MIN: CPT

## 2021-04-17 NOTE — HISTORY OF PRESENT ILLNESS
[Disease:__________________________] : Disease: [unfilled] [Treatment Protocol] : Treatment Protocol [de-identified] : Ms. 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  Splenomegaly was an incidental finding on a chest CT from 8/2020 which demonstrated bilateral groundglass infiltrates.  She is being followed by Dr. Anne for this.  Abdominal ultrasound from 11/5/2020 showed the spleen measuring 14.5 cm.  A 0.9 cm splenule was seen.  PET/CT showed the mild splenomegaly as well as diffuse low-level SUV positivity.  Pulmonary uptake was not described.  A tiny RUL lung nodule was seen which was too small to characterize. \par Bone marrow aspirate and biopsy on 9/30/2020 showed a hypercellular marrow involved by a CD5 and CD10 negative low-grade B-cell lymphoma, which made up about 10% of marrow cells.  By flow, there was a small (2.5%) monotypic B-cell population expressing kappa, CD19, CD20, CD22, CD11c (dim), FMC 7, minimal CD25 and negative for CD5, CD10, CD23 and .  CLL FISH panel was negative, including TP53.  FISH for BIRC3/MALT1 was also negative.\par CBC, differential and platelets have been for the most part normal.  Beta-2 MG was 2.7 on 10/6/2020.  CMP at that time was normal.  LDH was normal. \par Her major complaints are GI related: significant reflux sxs as well as early satiety.\par She has no constitutional complaints.\par  [FreeTextEntry1] : to start Rituxan weekly x 4  [de-identified] : marginal zone Lymphoma - completed weekly Rituxan \par hypogamma - no recent URI or pulmonary infections, remaining COVID safe\par poor sleeper-

## 2021-04-17 NOTE — ASSESSMENT
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 hold on IVIG for now\par S/P  rituximab 375 mg/sq m weekly x 4\par Risks, benefits, potential complications of therapy reviewed and all questions were answered\par pet/CT scan 3 months after last Tx\par follow up 3 months or PRN\par

## 2021-05-04 ENCOUNTER — APPOINTMENT (OUTPATIENT)
Dept: OBGYN | Facility: CLINIC | Age: 76
End: 2021-05-04

## 2021-05-06 ENCOUNTER — RX RENEWAL (OUTPATIENT)
Age: 76
End: 2021-05-06

## 2021-05-10 ENCOUNTER — RX RENEWAL (OUTPATIENT)
Age: 76
End: 2021-05-10

## 2021-06-06 ENCOUNTER — RX RENEWAL (OUTPATIENT)
Age: 76
End: 2021-06-06

## 2021-06-14 ENCOUNTER — APPOINTMENT (OUTPATIENT)
Dept: NUCLEAR MEDICINE | Facility: CLINIC | Age: 76
End: 2021-06-14
Payer: MEDICARE

## 2021-06-14 ENCOUNTER — OUTPATIENT (OUTPATIENT)
Dept: OUTPATIENT SERVICES | Facility: HOSPITAL | Age: 76
LOS: 1 days | End: 2021-06-14
Payer: MEDICARE

## 2021-06-14 DIAGNOSIS — C88.4 EXTRANODAL MARGINAL ZONE B-CELL LYMPHOMA OF MUCOSA-ASSOCIATED LYMPHOID TISSUE [MALT-LYMPHOMA]: ICD-10-CM

## 2021-06-14 PROCEDURE — A9552: CPT

## 2021-06-14 PROCEDURE — 78815 PET IMAGE W/CT SKULL-THIGH: CPT

## 2021-06-14 PROCEDURE — 78815 PET IMAGE W/CT SKULL-THIGH: CPT | Mod: 26,PS,MH

## 2021-06-28 ENCOUNTER — OUTPATIENT (OUTPATIENT)
Dept: OUTPATIENT SERVICES | Facility: HOSPITAL | Age: 76
LOS: 1 days | Discharge: HOME | End: 2021-06-28

## 2021-06-28 DIAGNOSIS — C85.88 OTHER SPECIFIED TYPES OF NON-HODGKIN LYMPHOMA, LYMPH NODES OF MULTIPLE SITES: ICD-10-CM

## 2021-07-01 ENCOUNTER — LABORATORY RESULT (OUTPATIENT)
Age: 76
End: 2021-07-01

## 2021-07-01 ENCOUNTER — APPOINTMENT (OUTPATIENT)
Dept: HEMATOLOGY ONCOLOGY | Facility: CLINIC | Age: 76
End: 2021-07-01
Payer: MEDICARE

## 2021-07-01 ENCOUNTER — RESULT REVIEW (OUTPATIENT)
Age: 76
End: 2021-07-01

## 2021-07-01 VITALS
SYSTOLIC BLOOD PRESSURE: 123 MMHG | BODY MASS INDEX: 23.22 KG/M2 | WEIGHT: 123 LBS | HEIGHT: 61 IN | RESPIRATION RATE: 16 BRPM | OXYGEN SATURATION: 96 % | HEART RATE: 56 BPM | DIASTOLIC BLOOD PRESSURE: 78 MMHG | TEMPERATURE: 97.7 F

## 2021-07-01 LAB
BASOPHILS # BLD AUTO: 0.03 K/UL — SIGNIFICANT CHANGE UP (ref 0–0.2)
BASOPHILS NFR BLD AUTO: 0.4 % — SIGNIFICANT CHANGE UP (ref 0–2)
EOSINOPHIL # BLD AUTO: 0.04 K/UL — SIGNIFICANT CHANGE UP (ref 0–0.5)
EOSINOPHIL NFR BLD AUTO: 0.6 % — SIGNIFICANT CHANGE UP (ref 0–6)
HCT VFR BLD CALC: 40.7 % — SIGNIFICANT CHANGE UP (ref 34.5–45)
HGB BLD-MCNC: 13.5 G/DL — SIGNIFICANT CHANGE UP (ref 11.5–15.5)
IMM GRANULOCYTES NFR BLD AUTO: 0.3 % — SIGNIFICANT CHANGE UP (ref 0–1.5)
LYMPHOCYTES # BLD AUTO: 0.72 K/UL — LOW (ref 1–3.3)
LYMPHOCYTES # BLD AUTO: 10.7 % — LOW (ref 13–44)
MCHC RBC-ENTMCNC: 31.9 PG — SIGNIFICANT CHANGE UP (ref 27–34)
MCHC RBC-ENTMCNC: 33.2 G/DL — SIGNIFICANT CHANGE UP (ref 32–36)
MCV RBC AUTO: 96.2 FL — SIGNIFICANT CHANGE UP (ref 80–100)
MONOCYTES # BLD AUTO: 0.59 K/UL — SIGNIFICANT CHANGE UP (ref 0–0.9)
MONOCYTES NFR BLD AUTO: 8.7 % — SIGNIFICANT CHANGE UP (ref 2–14)
NEUTROPHILS # BLD AUTO: 5.35 K/UL — SIGNIFICANT CHANGE UP (ref 1.8–7.4)
NEUTROPHILS NFR BLD AUTO: 79.3 % — HIGH (ref 43–77)
NRBC # BLD: 0 /100 WBCS — SIGNIFICANT CHANGE UP (ref 0–0)
PLATELET # BLD AUTO: 219 K/UL — SIGNIFICANT CHANGE UP (ref 150–400)
RBC # BLD: 4.23 M/UL — SIGNIFICANT CHANGE UP (ref 3.8–5.2)
RBC # FLD: 13.1 % — SIGNIFICANT CHANGE UP (ref 10.3–14.5)
WBC # BLD: 6.75 K/UL — SIGNIFICANT CHANGE UP (ref 3.8–10.5)
WBC # FLD AUTO: 6.75 K/UL — SIGNIFICANT CHANGE UP (ref 3.8–10.5)

## 2021-07-01 PROCEDURE — 99214 OFFICE O/P EST MOD 30 MIN: CPT

## 2021-07-01 NOTE — CONSULT LETTER
[Dear  ___] : Dear  [unfilled], [Courtesy Letter:] : I had the pleasure of seeing your patient, [unfilled], in my office today. [Please see my note below.] : Please see my note below. [Consult Closing:] : Thank you very much for allowing me to participate in the care of this patient.  If you have any questions, please do not hesitate to contact me. [Sincerely,] : Sincerely, [FreeTextEntry2] : Flores Jesus M.D. [FreeTextEntry3] : James West M.D., [facility]\par  [DrJuan  ___] : Dr. ANDRES [DrJuan ___] : Dr. ANDRES

## 2021-07-01 NOTE — HISTORY OF PRESENT ILLNESS
[Disease:__________________________] : Disease: [unfilled] [Treatment Protocol] : Treatment Protocol [de-identified] : Ms. 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  Splenomegaly was an incidental finding on a chest CT from 8/2020 which demonstrated bilateral groundglass infiltrates.  She is being followed by Dr. Anne for this.  Abdominal ultrasound from 11/5/2020 showed the spleen measuring 14.5 cm.  A 0.9 cm splenule was seen.  PET/CT showed the mild splenomegaly as well as diffuse low-level SUV positivity.  Pulmonary uptake was not described.  A tiny RUL lung nodule was seen which was too small to characterize. \par Bone marrow aspirate and biopsy on 9/30/2020 showed a hypercellular marrow involved by a CD5 and CD10 negative low-grade B-cell lymphoma, which made up about 10% of marrow cells.  By flow, there was a small (2.5%) monotypic B-cell population expressing kappa, CD19, CD20, CD22, CD11c (dim), FMC 7, minimal CD25 and negative for CD5, CD10, CD23 and .  CLL FISH panel was negative, including TP53.  FISH for BIRC3/MALT1 was also negative.\par CBC, differential and platelets have been for the most part normal.  Beta-2 MG was 2.7 on 10/6/2020.  CMP at that time was normal.  LDH was normal. \par Her major complaints are GI related: significant reflux sxs as well as early satiety.\par She has no constitutional complaints.\par  [FreeTextEntry1] : s/p Rituxan weekly x 4 completed 3/12/2021 [de-identified] : marginal zone Lymphoma - completed weekly Rituxan x 4 on 3/12/21\par well tolerated\par no pulm c/o today\par hypogamma - no recent URI or pulmonary infections, Covid Allen domain Ab + 4/12/21\par PET/CT 6/14/21 splenomegaly resolved - was 14 cm, now 9.7 cm, SUV < liver background\par Lungs (-), no comment on basilar fibrosis noted in prior CT chest

## 2021-07-01 NOTE — ASSESSMENT
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, spleen, marrow.\par S/P  rituximab 375 mg/sq m weekly x 4\par \par Significantly less less MEJIA \par lung dz due to lymphoma, ?ILD, both could be contributing to pulm c/o\par  \par CBC results reviewed and d/w pt\par WBC 6.75, Hgb 13.5, Plt 219K, ANC 5.35, ALC 0.72\par PET/CT as above\par Overall, excellent response\par Conflicting opinions re prophylactic IVIG - saw two immunologists, one recommended pre emptive use even with several yrs w/o serious sinopulmonary infections, another recommended using IVIG only if infectious problems recur\par Heme practice has been similar to second opinion - e.g. to hold on IVIG for now\par She did respond to Covid vaccine\par check CMP, LDH, beta 2 MG, Igs\par Pulm f/u with Dr. Anne\par \par  \par  \par

## 2021-08-11 ENCOUNTER — RX RENEWAL (OUTPATIENT)
Age: 76
End: 2021-08-11

## 2021-10-11 ENCOUNTER — TRANSCRIPTION ENCOUNTER (OUTPATIENT)
Age: 76
End: 2021-10-11

## 2021-10-28 ENCOUNTER — OUTPATIENT (OUTPATIENT)
Dept: OUTPATIENT SERVICES | Facility: HOSPITAL | Age: 76
LOS: 1 days | Discharge: HOME | End: 2021-10-28

## 2021-10-28 DIAGNOSIS — C85.80 OTHER SPECIFIED TYPES OF NON-HODGKIN LYMPHOMA, UNSPECIFIED SITE: ICD-10-CM

## 2021-11-01 ENCOUNTER — RESULT REVIEW (OUTPATIENT)
Age: 76
End: 2021-11-01

## 2021-11-01 ENCOUNTER — LABORATORY RESULT (OUTPATIENT)
Age: 76
End: 2021-11-01

## 2021-11-01 ENCOUNTER — APPOINTMENT (OUTPATIENT)
Dept: HEMATOLOGY ONCOLOGY | Facility: CLINIC | Age: 76
End: 2021-11-01
Payer: MEDICARE

## 2021-11-01 VITALS
BODY MASS INDEX: 22.57 KG/M2 | WEIGHT: 118 LBS | OXYGEN SATURATION: 97 % | HEIGHT: 60.5 IN | TEMPERATURE: 97 F | RESPIRATION RATE: 16 BRPM | SYSTOLIC BLOOD PRESSURE: 127 MMHG | HEART RATE: 57 BPM | DIASTOLIC BLOOD PRESSURE: 69 MMHG

## 2021-11-01 DIAGNOSIS — Z80.0 FAMILY HISTORY OF MALIGNANT NEOPLASM OF DIGESTIVE ORGANS: ICD-10-CM

## 2021-11-01 DIAGNOSIS — Z80.52 FAMILY HISTORY OF MALIGNANT NEOPLASM OF BLADDER: ICD-10-CM

## 2021-11-01 DIAGNOSIS — Z80.7 FAMILY HISTORY OF OTHER MALIGNANT NEOPLASMS OF LYMPHOID, HEMATOPOIETIC AND RELATED TISSUES: ICD-10-CM

## 2021-11-01 LAB
BASOPHILS # BLD AUTO: 0.03 K/UL — SIGNIFICANT CHANGE UP (ref 0–0.2)
BASOPHILS NFR BLD AUTO: 0.5 % — SIGNIFICANT CHANGE UP (ref 0–2)
EOSINOPHIL # BLD AUTO: 0.07 K/UL — SIGNIFICANT CHANGE UP (ref 0–0.5)
EOSINOPHIL NFR BLD AUTO: 1.2 % — SIGNIFICANT CHANGE UP (ref 0–6)
HCT VFR BLD CALC: 41.9 % — SIGNIFICANT CHANGE UP (ref 34.5–45)
HGB BLD-MCNC: 14.2 G/DL — SIGNIFICANT CHANGE UP (ref 11.5–15.5)
IMM GRANULOCYTES NFR BLD AUTO: 0.3 % — SIGNIFICANT CHANGE UP (ref 0–1.5)
LYMPHOCYTES # BLD AUTO: 1.64 K/UL — SIGNIFICANT CHANGE UP (ref 1–3.3)
LYMPHOCYTES # BLD AUTO: 27.9 % — SIGNIFICANT CHANGE UP (ref 13–44)
MCHC RBC-ENTMCNC: 32.9 PG — SIGNIFICANT CHANGE UP (ref 27–34)
MCHC RBC-ENTMCNC: 33.9 G/DL — SIGNIFICANT CHANGE UP (ref 32–36)
MCV RBC AUTO: 97.2 FL — SIGNIFICANT CHANGE UP (ref 80–100)
MONOCYTES # BLD AUTO: 0.5 K/UL — SIGNIFICANT CHANGE UP (ref 0–0.9)
MONOCYTES NFR BLD AUTO: 8.5 % — SIGNIFICANT CHANGE UP (ref 2–14)
NEUTROPHILS # BLD AUTO: 3.61 K/UL — SIGNIFICANT CHANGE UP (ref 1.8–7.4)
NEUTROPHILS NFR BLD AUTO: 61.6 % — SIGNIFICANT CHANGE UP (ref 43–77)
NRBC # BLD: 0 /100 WBCS — SIGNIFICANT CHANGE UP (ref 0–0)
PLATELET # BLD AUTO: 239 K/UL — SIGNIFICANT CHANGE UP (ref 150–400)
RBC # BLD: 4.31 M/UL — SIGNIFICANT CHANGE UP (ref 3.8–5.2)
RBC # FLD: 12.8 % — SIGNIFICANT CHANGE UP (ref 10.3–14.5)
WBC # BLD: 5.87 K/UL — SIGNIFICANT CHANGE UP (ref 3.8–10.5)
WBC # FLD AUTO: 5.87 K/UL — SIGNIFICANT CHANGE UP (ref 3.8–10.5)

## 2021-11-01 PROCEDURE — 99214 OFFICE O/P EST MOD 30 MIN: CPT

## 2021-11-01 RX ORDER — OMEPRAZOLE 40 MG/1
40 CAPSULE, DELAYED RELEASE ORAL
Refills: 0 | Status: DISCONTINUED | COMMUNITY
Start: 2020-11-09 | End: 2021-11-01

## 2021-11-01 NOTE — REVIEW OF SYSTEMS
[Negative] : Allergic/Immunologic [Cough] : cough [Shortness Of Breath] : no shortness of breath [Abdominal Pain] : no abdominal pain [FreeTextEntry6] : minimal cough [FreeTextEntry7] : GERD sxs controlled with famotidine

## 2021-11-01 NOTE — PHYSICAL EXAM
[Fully active, able to carry on all pre-disease performance without restriction] : Status 0 - Fully active, able to carry on all pre-disease performance without restriction [Normal] : affect appropriate [de-identified] : no CVAT

## 2021-11-01 NOTE — ASSESSMENT
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 ILD may have responded to rituxanlung dz due to lymphoma, ?ILD, both could be contributing to pulm c/o\par both  may have  resp to rituxan\par  \par CBC results reviewed and d/w pt\par WBC 5.87, Hgb 14.2, Plt 239K, ANC 5.35, ALC 0.72\par PET/CT as above\par Overall, excellent response\par Conflicting opinions re prophylactic IVIG - saw two immunologists, one recommended preemptive use even with several yrs w/o serious sinopulmonary infections, another recommended using IVIG only if infectious problems recur\par Heme practice has been similar to second opinion - e.g. to hold on IVIG for now\par She did respond to Covid vaccine, + Allen Ab\par check CMP, LDH, beta 2 MG, Igs\par Pulm f/u with Dr. Anne\par Will f/u last imaging with CT chest and abd U/S in next few wks\par RV 4 mos\par \par  \par  \par

## 2021-11-01 NOTE — HISTORY OF PRESENT ILLNESS
[Disease:__________________________] : Disease: [unfilled] [Treatment Protocol] : Treatment Protocol [de-identified] : Ms. 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  Splenomegaly was an incidental finding on a chest CT from 8/2020 which demonstrated bilateral groundglass infiltrates.  She is being followed by Dr. Anne for this.  Abdominal ultrasound from 11/5/2020 showed the spleen measuring 14.5 cm.  A 0.9 cm splenule was seen.  PET/CT showed the mild splenomegaly as well as diffuse low-level SUV positivity.  Pulmonary uptake was not described.  A tiny RUL lung nodule was seen which was too small to characterize. \par Bone marrow aspirate and biopsy on 9/30/2020 showed a hypercellular marrow involved by a CD5 and CD10 negative low-grade B-cell lymphoma, which made up about 10% of marrow cells.  By flow, there was a small (2.5%) monotypic B-cell population expressing kappa, CD19, CD20, CD22, CD11c (dim), FMC 7, minimal CD25 and negative for CD5, CD10, CD23 and .  CLL FISH panel was negative, including TP53.  FISH for BIRC3/MALT1 was also negative.\par CBC, differential and platelets have been for the most part normal.  Beta-2 MG was 2.7 on 10/6/2020.  CMP at that time was normal.  LDH was normal. \par Her major complaints are GI related: significant reflux sxs as well as early satiety.\par She has no constitutional complaints.\par  [FreeTextEntry1] : s/p Rituxan weekly x 4 completed 3/12/2021 [de-identified] : marginal zone Lymphoma - completed weekly Rituxan x 4 on 3/12/21\par well tolerated\par no pulm c/o today, says recent PFTs better\par no SOB, minimal cough ? related to reflux\par hypogamma - no recent URI or pulmonary infections, Covid Allen domain Ab + 4/12/21\par PET/CT 6/14/21 splenomegaly resolved - was 14 cm, now 9.7 cm, SUV < liver background\par Lungs (-), no comment on basilar fibrosis noted in prior CT chest\par SAJAN, not tolerating CPAP

## 2021-11-01 NOTE — CONSULT LETTER
[Dear  ___] : Dear  [unfilled], [Courtesy Letter:] : I had the pleasure of seeing your patient, [unfilled], in my office today. [Please see my note below.] : Please see my note below. [Consult Closing:] : Thank you very much for allowing me to participate in the care of this patient.  If you have any questions, please do not hesitate to contact me. [Sincerely,] : Sincerely, [DrJuan  ___] : Dr. ANDRES [DrJuan ___] : Dr. ANDRES [FreeTextEntry2] : Flores Jesus M.D. [FreeTextEntry3] : James West M.D., [facility]\par

## 2021-11-19 ENCOUNTER — OUTPATIENT (OUTPATIENT)
Dept: OUTPATIENT SERVICES | Facility: HOSPITAL | Age: 76
LOS: 1 days | End: 2021-11-19
Payer: MEDICARE

## 2021-11-19 ENCOUNTER — APPOINTMENT (OUTPATIENT)
Dept: ULTRASOUND IMAGING | Facility: CLINIC | Age: 76
End: 2021-11-19
Payer: MEDICARE

## 2021-11-19 ENCOUNTER — APPOINTMENT (OUTPATIENT)
Dept: CT IMAGING | Facility: CLINIC | Age: 76
End: 2021-11-19
Payer: MEDICARE

## 2021-11-19 DIAGNOSIS — C83.07 SMALL CELL B-CELL LYMPHOMA, SPLEEN: ICD-10-CM

## 2021-11-19 PROCEDURE — G1004: CPT

## 2021-11-19 PROCEDURE — 71250 CT THORAX DX C-: CPT | Mod: 26,MG

## 2021-11-19 PROCEDURE — 76700 US EXAM ABDOM COMPLETE: CPT

## 2021-11-19 PROCEDURE — 76700 US EXAM ABDOM COMPLETE: CPT | Mod: 26

## 2021-11-19 PROCEDURE — 71250 CT THORAX DX C-: CPT | Mod: MG

## 2021-11-29 ENCOUNTER — TRANSCRIPTION ENCOUNTER (OUTPATIENT)
Age: 76
End: 2021-11-29

## 2022-02-10 ENCOUNTER — RX RENEWAL (OUTPATIENT)
Age: 77
End: 2022-02-10

## 2022-03-14 ENCOUNTER — OUTPATIENT (OUTPATIENT)
Dept: OUTPATIENT SERVICES | Facility: HOSPITAL | Age: 77
LOS: 1 days | Discharge: HOME | End: 2022-03-14

## 2022-03-14 DIAGNOSIS — C85.80 OTHER SPECIFIED TYPES OF NON-HODGKIN LYMPHOMA, UNSPECIFIED SITE: ICD-10-CM

## 2022-03-15 ENCOUNTER — RESULT REVIEW (OUTPATIENT)
Age: 77
End: 2022-03-15

## 2022-03-15 ENCOUNTER — APPOINTMENT (OUTPATIENT)
Dept: HEMATOLOGY ONCOLOGY | Facility: CLINIC | Age: 77
End: 2022-03-15
Payer: MEDICARE

## 2022-03-15 VITALS
RESPIRATION RATE: 16 BRPM | OXYGEN SATURATION: 98 % | HEART RATE: 85 BPM | SYSTOLIC BLOOD PRESSURE: 122 MMHG | HEIGHT: 60.35 IN | WEIGHT: 123.46 LBS | BODY MASS INDEX: 23.92 KG/M2 | DIASTOLIC BLOOD PRESSURE: 75 MMHG | TEMPERATURE: 97.5 F

## 2022-03-15 DIAGNOSIS — Z78.9 OTHER SPECIFIED HEALTH STATUS: ICD-10-CM

## 2022-03-15 LAB
BASOPHILS # BLD AUTO: 0.02 K/UL — SIGNIFICANT CHANGE UP (ref 0–0.2)
BASOPHILS NFR BLD AUTO: 0.4 % — SIGNIFICANT CHANGE UP (ref 0–2)
EOSINOPHIL # BLD AUTO: 0.06 K/UL — SIGNIFICANT CHANGE UP (ref 0–0.5)
EOSINOPHIL NFR BLD AUTO: 1.2 % — SIGNIFICANT CHANGE UP (ref 0–6)
HCT VFR BLD CALC: 41.8 % — SIGNIFICANT CHANGE UP (ref 34.5–45)
HGB BLD-MCNC: 13.9 G/DL — SIGNIFICANT CHANGE UP (ref 11.5–15.5)
IMM GRANULOCYTES NFR BLD AUTO: 0.4 % — SIGNIFICANT CHANGE UP (ref 0–1.5)
LYMPHOCYTES # BLD AUTO: 1.31 K/UL — SIGNIFICANT CHANGE UP (ref 1–3.3)
LYMPHOCYTES # BLD AUTO: 25.1 % — SIGNIFICANT CHANGE UP (ref 13–44)
MCHC RBC-ENTMCNC: 32.6 PG — SIGNIFICANT CHANGE UP (ref 27–34)
MCHC RBC-ENTMCNC: 33.3 G/DL — SIGNIFICANT CHANGE UP (ref 32–36)
MCV RBC AUTO: 97.9 FL — SIGNIFICANT CHANGE UP (ref 80–100)
MONOCYTES # BLD AUTO: 0.44 K/UL — SIGNIFICANT CHANGE UP (ref 0–0.9)
MONOCYTES NFR BLD AUTO: 8.4 % — SIGNIFICANT CHANGE UP (ref 2–14)
NEUTROPHILS # BLD AUTO: 3.36 K/UL — SIGNIFICANT CHANGE UP (ref 1.8–7.4)
NEUTROPHILS NFR BLD AUTO: 64.5 % — SIGNIFICANT CHANGE UP (ref 43–77)
NRBC # BLD: 0 /100 WBCS — SIGNIFICANT CHANGE UP (ref 0–0)
PLATELET # BLD AUTO: 224 K/UL — SIGNIFICANT CHANGE UP (ref 150–400)
RBC # BLD: 4.27 M/UL — SIGNIFICANT CHANGE UP (ref 3.8–5.2)
RBC # FLD: 13.1 % — SIGNIFICANT CHANGE UP (ref 10.3–14.5)
WBC # BLD: 5.21 K/UL — SIGNIFICANT CHANGE UP (ref 3.8–10.5)
WBC # FLD AUTO: 5.21 K/UL — SIGNIFICANT CHANGE UP (ref 3.8–10.5)

## 2022-03-15 PROCEDURE — 99214 OFFICE O/P EST MOD 30 MIN: CPT

## 2022-03-15 RX ORDER — CHLORHEXIDINE GLUCONATE 4 %
5 LIQUID (ML) TOPICAL
Refills: 0 | Status: ACTIVE | COMMUNITY

## 2022-03-15 RX ORDER — ZOLPIDEM TARTRATE 10 MG/1
10 TABLET ORAL
Qty: 30 | Refills: 0 | Status: DISCONTINUED | COMMUNITY
Start: 2021-03-15 | End: 2022-03-15

## 2022-03-16 ENCOUNTER — APPOINTMENT (OUTPATIENT)
Dept: CT IMAGING | Facility: IMAGING CENTER | Age: 77
End: 2022-03-16
Payer: MEDICARE

## 2022-03-16 ENCOUNTER — RESULT REVIEW (OUTPATIENT)
Age: 77
End: 2022-03-16

## 2022-03-16 ENCOUNTER — OUTPATIENT (OUTPATIENT)
Dept: OUTPATIENT SERVICES | Facility: HOSPITAL | Age: 77
LOS: 1 days | End: 2022-03-16
Payer: MEDICARE

## 2022-03-16 DIAGNOSIS — C83.07 SMALL CELL B-CELL LYMPHOMA, SPLEEN: ICD-10-CM

## 2022-03-16 PROCEDURE — 71250 CT THORAX DX C-: CPT | Mod: MH

## 2022-03-16 PROCEDURE — 71250 CT THORAX DX C-: CPT | Mod: 26,MH

## 2022-04-03 ENCOUNTER — TRANSCRIPTION ENCOUNTER (OUTPATIENT)
Age: 77
End: 2022-04-03

## 2022-04-03 PROBLEM — M54.9 CHRONIC BACK PAIN: Status: ACTIVE | Noted: 2017-04-13

## 2022-04-03 NOTE — HISTORY OF PRESENT ILLNESS
[Disease:__________________________] : Disease: [unfilled] [de-identified] : Ms. 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  Splenomegaly was an incidental finding on a chest CT from 8/2020 which demonstrated bilateral groundglass infiltrates.  She is being followed by Dr. Anne for this.  Abdominal ultrasound from 11/5/2020 showed the spleen measuring 14.5 cm.  A 0.9 cm splenule was seen.  PET/CT showed the mild splenomegaly as well as diffuse low-level SUV positivity.  Pulmonary uptake was not described.  A tiny RUL lung nodule was seen which was too small to characterize. \par Bone marrow aspirate and biopsy on 9/30/2020 showed a hypercellular marrow involved by a CD5 and CD10 negative low-grade B-cell lymphoma, which made up about 10% of marrow cells.  By flow, there was a small (2.5%) monotypic B-cell population expressing kappa, CD19, CD20, CD22, CD11c (dim), FMC 7, minimal CD25 and negative for CD5, CD10, CD23 and .  CLL FISH panel was negative, including TP53.  FISH for BIRC3/MALT1 was also negative.\par CBC, differential and platelets have been for the most part normal.  Beta-2 MG was 2.7 on 10/6/2020.  CMP at that time was normal.  LDH was normal. \par Her major complaints are GI related: significant reflux sxs as well as early satiety.\par She has no constitutional complaints.\par  [FreeTextEntry1] : MZL Sandy 3/5/2021  [de-identified] : MZL - completing Rituxan weekly x 4 with good tolerance GERD symptoms better , SOB better \par COVID - no symptoms vaccine completed

## 2022-04-03 NOTE — REVIEW OF SYSTEMS
[Negative] : Allergic/Immunologic [FreeTextEntry6] : SOB R/t reflux  [FreeTextEntry9] : coccyx and sacrum hips  [FreeTextEntry7] : reflux [de-identified] : dry skin both elbow red

## 2022-04-03 NOTE — HISTORY OF PRESENT ILLNESS
[Disease:__________________________] : Disease: [unfilled] [de-identified] : Ms. 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  Splenomegaly was an incidental finding on a chest CT from 8/2020 which demonstrated bilateral groundglass infiltrates.  She is being followed by Dr. Anne for this.  Abdominal ultrasound from 11/5/2020 showed the spleen measuring 14.5 cm.  A 0.9 cm splenule was seen.  PET/CT showed the mild splenomegaly as well as diffuse low-level SUV positivity.  Pulmonary uptake was not described.  A tiny RUL lung nodule was seen which was too small to characterize. \par Bone marrow aspirate and biopsy on 9/30/2020 showed a hypercellular marrow involved by a CD5 and CD10 negative low-grade B-cell lymphoma, which made up about 10% of marrow cells.  By flow, there was a small (2.5%) monotypic B-cell population expressing kappa, CD19, CD20, CD22, CD11c (dim), FMC 7, minimal CD25 and negative for CD5, CD10, CD23 and .  CLL FISH panel was negative, including TP53.  FISH for BIRC3/MALT1 was also negative.\par CBC, differential and platelets have been for the most part normal.  Beta-2 MG was 2.7 on 10/6/2020.  CMP at that time was normal.  LDH was normal. \par Her major complaints are GI related: significant reflux sxs as well as early satiety.\par She has no constitutional complaints.\par  [FreeTextEntry1] : MZL Sandy 3/5/2021  [de-identified] : marginal zone Lymphoma - completed weekly Rituxan 3/21 no new symptoms \par hypogamma - no recent URI or pulmonary infections, remaining COVID safe received vaccine x4 \par poor sleeper-

## 2022-04-03 NOTE — ASSESSMENT
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 ILD may have responded to rituxanlung dz due to lymphoma, ?ILD, both could be contributing to pulm c/o\par  \par CBC results reviewed and d/w pt\par Overall, excellent response\par Conflicting opinions re prophylactic IVIG - saw two immunologists, one recommended preemptive use even with several yrs w/o serious sinopulmonary infections, another recommended using IVIG only if infectious problems recur\par Heme practice has been similar to second opinion - e.g. to hold on IVIG for now\par She did respond to Covid vaccine, + Allen Ab\par check CMP, LDH, beta 2 MG, Igs\par Pulm f/u with Dr. Anne\par RV 4 mos\par \par  \par  \par

## 2022-04-03 NOTE — REVIEW OF SYSTEMS
[Shortness Of Breath] : shortness of breath [Dizziness] : dizziness [Negative] : Allergic/Immunologic [FreeTextEntry3] : rt eye no vision [FreeTextEntry6] : feels r/t GERD symptoms takes Omeprazole /Pepcid  [de-identified] : mild lightheadedness

## 2022-04-03 NOTE — ASSESSMENT
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 ILD may have responded to rituxanlung dz due to lymphoma, ?ILD, both could be contributing to pulm c/o\par both  may have  resp to rituxan\par  \par CBC results reviewed and d/w pt\par Overall, excellent response\par Conflicting opinions re prophylactic IVIG - saw two immunologists, one recommended preemptive use even with several yrs w/o serious sinopulmonary infections, another recommended using IVIG only if infectious problems recur\par Heme practice has been similar to second opinion - e.g. to hold on IVIG for now\par She did respond to Covid vaccine, + Allen Ab\par check CMP, LDH, beta 2 MG, Igs\par Pulm f/u with Dr. Anne\par Will f/u last imaging with CT chest \par RV 4 mos\par \par  \par  \par

## 2022-04-06 ENCOUNTER — APPOINTMENT (OUTPATIENT)
Dept: MRI IMAGING | Facility: IMAGING CENTER | Age: 77
End: 2022-04-06
Payer: MEDICARE

## 2022-04-06 ENCOUNTER — OUTPATIENT (OUTPATIENT)
Dept: OUTPATIENT SERVICES | Facility: HOSPITAL | Age: 77
LOS: 1 days | End: 2022-04-06
Payer: MEDICARE

## 2022-04-06 DIAGNOSIS — C88.4 EXTRANODAL MARGINAL ZONE B-CELL LYMPHOMA OF MUCOSA-ASSOCIATED LYMPHOID TISSUE [MALT-LYMPHOMA]: ICD-10-CM

## 2022-04-06 PROCEDURE — 72148 MRI LUMBAR SPINE W/O DYE: CPT | Mod: 26,MG

## 2022-04-06 PROCEDURE — G1004: CPT

## 2022-04-06 PROCEDURE — 72148 MRI LUMBAR SPINE W/O DYE: CPT | Mod: MG

## 2022-05-02 ENCOUNTER — RX RENEWAL (OUTPATIENT)
Age: 77
End: 2022-05-02

## 2022-05-24 ENCOUNTER — APPOINTMENT (OUTPATIENT)
Dept: OBGYN | Facility: CLINIC | Age: 77
End: 2022-05-24

## 2022-05-29 ENCOUNTER — NON-APPOINTMENT (OUTPATIENT)
Age: 77
End: 2022-05-29

## 2022-06-13 ENCOUNTER — NON-APPOINTMENT (OUTPATIENT)
Age: 77
End: 2022-06-13

## 2022-06-25 ENCOUNTER — RESULT REVIEW (OUTPATIENT)
Age: 77
End: 2022-06-25

## 2022-06-25 ENCOUNTER — OUTPATIENT (OUTPATIENT)
Dept: OUTPATIENT SERVICES | Facility: HOSPITAL | Age: 77
LOS: 1 days | Discharge: HOME | End: 2022-06-25

## 2022-06-25 ENCOUNTER — APPOINTMENT (OUTPATIENT)
Age: 77
End: 2022-06-25

## 2022-06-25 ENCOUNTER — APPOINTMENT (OUTPATIENT)
Dept: HEMATOLOGY ONCOLOGY | Facility: CLINIC | Age: 77
End: 2022-06-25

## 2022-06-25 DIAGNOSIS — C85.80 OTHER SPECIFIED TYPES OF NON-HODGKIN LYMPHOMA, UNSPECIFIED SITE: ICD-10-CM

## 2022-06-25 LAB
BASOPHILS # BLD AUTO: 0.04 K/UL — SIGNIFICANT CHANGE UP (ref 0–0.2)
BASOPHILS NFR BLD AUTO: 0.5 % — SIGNIFICANT CHANGE UP (ref 0–2)
EOSINOPHIL # BLD AUTO: 0.11 K/UL — SIGNIFICANT CHANGE UP (ref 0–0.5)
EOSINOPHIL NFR BLD AUTO: 1.3 % — SIGNIFICANT CHANGE UP (ref 0–6)
HCT VFR BLD CALC: 39.5 % — SIGNIFICANT CHANGE UP (ref 34.5–45)
HGB BLD-MCNC: 13.4 G/DL — SIGNIFICANT CHANGE UP (ref 11.5–15.5)
IMM GRANULOCYTES NFR BLD AUTO: 0.7 % — SIGNIFICANT CHANGE UP (ref 0–1.5)
LYMPHOCYTES # BLD AUTO: 1.86 K/UL — SIGNIFICANT CHANGE UP (ref 1–3.3)
LYMPHOCYTES # BLD AUTO: 22.3 % — SIGNIFICANT CHANGE UP (ref 13–44)
MCHC RBC-ENTMCNC: 32.3 PG — SIGNIFICANT CHANGE UP (ref 27–34)
MCHC RBC-ENTMCNC: 33.9 G/DL — SIGNIFICANT CHANGE UP (ref 32–36)
MCV RBC AUTO: 95.2 FL — SIGNIFICANT CHANGE UP (ref 80–100)
MONOCYTES # BLD AUTO: 0.73 K/UL — SIGNIFICANT CHANGE UP (ref 0–0.9)
MONOCYTES NFR BLD AUTO: 8.8 % — SIGNIFICANT CHANGE UP (ref 2–14)
NEUTROPHILS # BLD AUTO: 5.53 K/UL — SIGNIFICANT CHANGE UP (ref 1.8–7.4)
NEUTROPHILS NFR BLD AUTO: 66.4 % — SIGNIFICANT CHANGE UP (ref 43–77)
NRBC # BLD: 0 /100 WBCS — SIGNIFICANT CHANGE UP (ref 0–0)
PLATELET # BLD AUTO: 299 K/UL — SIGNIFICANT CHANGE UP (ref 150–400)
RBC # BLD: 4.15 M/UL — SIGNIFICANT CHANGE UP (ref 3.8–5.2)
RBC # FLD: 12.6 % — SIGNIFICANT CHANGE UP (ref 10.3–14.5)
WBC # BLD: 8.33 K/UL — SIGNIFICANT CHANGE UP (ref 3.8–10.5)
WBC # FLD AUTO: 8.33 K/UL — SIGNIFICANT CHANGE UP (ref 3.8–10.5)

## 2022-07-19 ENCOUNTER — APPOINTMENT (OUTPATIENT)
Dept: HEMATOLOGY ONCOLOGY | Facility: CLINIC | Age: 77
End: 2022-07-19

## 2022-07-19 ENCOUNTER — LABORATORY RESULT (OUTPATIENT)
Age: 77
End: 2022-07-19

## 2022-07-19 ENCOUNTER — RESULT REVIEW (OUTPATIENT)
Age: 77
End: 2022-07-19

## 2022-07-19 VITALS
WEIGHT: 116.84 LBS | TEMPERATURE: 96.6 F | HEART RATE: 62 BPM | RESPIRATION RATE: 14 BRPM | BODY MASS INDEX: 22.55 KG/M2 | DIASTOLIC BLOOD PRESSURE: 77 MMHG | SYSTOLIC BLOOD PRESSURE: 131 MMHG | OXYGEN SATURATION: 93 %

## 2022-07-19 DIAGNOSIS — M54.2 CERVICALGIA: ICD-10-CM

## 2022-07-19 DIAGNOSIS — R16.1 SPLENOMEGALY, NOT ELSEWHERE CLASSIFIED: ICD-10-CM

## 2022-07-19 DIAGNOSIS — G89.29 CERVICALGIA: ICD-10-CM

## 2022-07-19 LAB
BASOPHILS # BLD AUTO: 0.02 K/UL — SIGNIFICANT CHANGE UP (ref 0–0.2)
BASOPHILS NFR BLD AUTO: 0.4 % — SIGNIFICANT CHANGE UP (ref 0–2)
EOSINOPHIL # BLD AUTO: 0.06 K/UL — SIGNIFICANT CHANGE UP (ref 0–0.5)
EOSINOPHIL NFR BLD AUTO: 1.2 % — SIGNIFICANT CHANGE UP (ref 0–6)
HCT VFR BLD CALC: 40.5 % — SIGNIFICANT CHANGE UP (ref 34.5–45)
HGB BLD-MCNC: 13.6 G/DL — SIGNIFICANT CHANGE UP (ref 11.5–15.5)
IMM GRANULOCYTES NFR BLD AUTO: 0 % — SIGNIFICANT CHANGE UP (ref 0–1.5)
LYMPHOCYTES # BLD AUTO: 1.4 K/UL — SIGNIFICANT CHANGE UP (ref 1–3.3)
LYMPHOCYTES # BLD AUTO: 29 % — SIGNIFICANT CHANGE UP (ref 13–44)
MCHC RBC-ENTMCNC: 32.4 PG — SIGNIFICANT CHANGE UP (ref 27–34)
MCHC RBC-ENTMCNC: 33.6 G/DL — SIGNIFICANT CHANGE UP (ref 32–36)
MCV RBC AUTO: 96.4 FL — SIGNIFICANT CHANGE UP (ref 80–100)
MONOCYTES # BLD AUTO: 0.41 K/UL — SIGNIFICANT CHANGE UP (ref 0–0.9)
MONOCYTES NFR BLD AUTO: 8.5 % — SIGNIFICANT CHANGE UP (ref 2–14)
NEUTROPHILS # BLD AUTO: 2.93 K/UL — SIGNIFICANT CHANGE UP (ref 1.8–7.4)
NEUTROPHILS NFR BLD AUTO: 60.9 % — SIGNIFICANT CHANGE UP (ref 43–77)
NRBC # BLD: 0 /100 WBCS — SIGNIFICANT CHANGE UP (ref 0–0)
PLATELET # BLD AUTO: 253 K/UL — SIGNIFICANT CHANGE UP (ref 150–400)
RBC # BLD: 4.2 M/UL — SIGNIFICANT CHANGE UP (ref 3.8–5.2)
RBC # FLD: 13 % — SIGNIFICANT CHANGE UP (ref 10.3–14.5)
WBC # BLD: 4.82 K/UL — SIGNIFICANT CHANGE UP (ref 3.8–10.5)
WBC # FLD AUTO: 4.82 K/UL — SIGNIFICANT CHANGE UP (ref 3.8–10.5)

## 2022-07-19 PROCEDURE — 99214 OFFICE O/P EST MOD 30 MIN: CPT

## 2022-07-19 RX ORDER — AZITHROMYCIN 250 MG/1
250 TABLET, FILM COATED ORAL
Qty: 6 | Refills: 0 | Status: DISCONTINUED | COMMUNITY
Start: 2022-04-12

## 2022-07-19 RX ORDER — OMEPRAZOLE 20 MG/1
20 CAPSULE, DELAYED RELEASE ORAL
Qty: 90 | Refills: 0 | Status: ACTIVE | COMMUNITY
Start: 2022-04-11

## 2022-07-19 RX ORDER — COVID-19 ANTIGEN TEST
KIT MISCELLANEOUS
Qty: 8 | Refills: 0 | Status: ACTIVE | COMMUNITY
Start: 2022-05-30

## 2022-07-19 RX ORDER — SUCRALFATE 1 G/1
1 TABLET ORAL
Qty: 60 | Refills: 0 | Status: ACTIVE | COMMUNITY
Start: 2022-05-04

## 2022-07-19 RX ORDER — METHYLPREDNISOLONE 4 MG/1
4 TABLET ORAL
Qty: 21 | Refills: 0 | Status: COMPLETED | COMMUNITY
Start: 2022-04-15

## 2022-07-19 RX ORDER — MELOXICAM 15 MG/1
15 TABLET ORAL
Qty: 30 | Refills: 0 | Status: DISCONTINUED | COMMUNITY
Start: 2021-04-12 | End: 2022-07-19

## 2022-07-19 RX ORDER — OSELTAMIVIR PHOSPHATE 75 MG/1
75 CAPSULE ORAL
Qty: 10 | Refills: 0 | Status: COMPLETED | COMMUNITY
Start: 2022-04-13

## 2022-07-19 RX ORDER — CEFUROXIME AXETIL 500 MG/1
500 TABLET ORAL
Qty: 20 | Refills: 0 | Status: DISCONTINUED | COMMUNITY
Start: 2022-04-15

## 2022-07-19 RX ORDER — FAMOTIDINE 20 MG/1
20 TABLET, FILM COATED ORAL
Qty: 90 | Refills: 0 | Status: ACTIVE | COMMUNITY
Start: 2021-08-25

## 2022-07-19 RX ORDER — METOCLOPRAMIDE 10 MG/1
10 TABLET ORAL
Qty: 60 | Refills: 6 | Status: DISCONTINUED | COMMUNITY
Start: 2021-02-26 | End: 2022-07-19

## 2022-07-19 NOTE — CONSULT LETTER
[FreeTextEntry3] : James West M.D., [facility]\par  [DrJuan  ___] : Dr. ANDRES [DrJuan ___] : Dr. ANDRES

## 2022-07-19 NOTE — DISCUSSION/SUMMARY
[FreeTextEntry1] : Patient called reporting several new bruises on her body including hand, groin, left knee. denies any trauma, requesting to have a cbc. She reports recently having the evusheld injection 1.5 weeks ago. Was able to arrange for CBC today at 315.

## 2022-07-19 NOTE — HISTORY OF PRESENT ILLNESS
[Disease:__________________________] : Disease: [unfilled] [de-identified] : Ms. 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  Splenomegaly was an incidental finding on a chest CT from 8/2020 which demonstrated bilateral groundglass infiltrates.  She is being followed by Dr. Anne for this.  Abdominal ultrasound from 11/5/2020 showed the spleen measuring 14.5 cm.  A 0.9 cm splenule was seen.  PET/CT showed the mild splenomegaly as well as diffuse low-level SUV positivity.  Pulmonary uptake was not described.  A tiny RUL lung nodule was seen which was too small to characterize. \par Bone marrow aspirate and biopsy on 9/30/2020 showed a hypercellular marrow involved by a CD5 and CD10 negative low-grade B-cell lymphoma, which made up about 10% of marrow cells.  By flow, there was a small (2.5%) monotypic B-cell population expressing kappa, CD19, CD20, CD22, CD11c (dim), FMC 7, minimal CD25 and negative for CD5, CD10, CD23 and .  CLL FISH panel was negative, including TP53.  FISH for BIRC3/MALT1 was also negative.\par CBC, differential and platelets have been for the most part normal.  Beta-2 MG was 2.7 on 10/6/2020.  CMP at that time was normal.  LDH was normal. \par Her major complaints are GI related: significant reflux sxs as well as early satiety.\par She has no constitutional complaints.\par  [FreeTextEntry1] : MZL Sandy 3/5/2021  [de-identified] : marginal zone Lymphoma - completed weekly Rituxan 3/2021 \par PET CT 6/2021 resolution of splenomegaly, no pulm findings\par Last CT chest 3/2022 - stable pulm findings,  stable bibasilar reticular opacities \par hypogamma - no recent URI or pulmonary infections, remaining COVID safe received vaccine x4 \par poor sleeper-main c/o is fatigue\par has SAJAN, has CPAP

## 2022-07-19 NOTE — REVIEW OF SYSTEMS
[Shortness Of Breath] : shortness of breath [Joint Pain] : joint pain [Negative] : Neurological [FreeTextEntry6] : poss in part related to GERD [FreeTextEntry9] : chronic neck pain

## 2022-07-19 NOTE — ASSESSMENT
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 ILD may have responded to rituxan  \par  \par  \par CBC results reviewed and d/w pt\par WBC 4.82, Hgb 13.6, Plt 253K, nl diff\par \par Overall, excellent response\par \par Hypogamma: Conflicting opinions re prophylactic IVIG - saw two immunologists, one recommended preemptive use even with several yrs w/o serious sinopulmonary infections, another recommended using IVIG only if infectious problems recur\par Heme practice has been similar to second opinion - e.g. to hold on IVIG for now\par She did respond to Covid vaccines, + Allen Ab > 250\par \par check CMP, LDH, beta 2 MG, Igs\par Pulm f/u with Dr. Anne\par \par Will f/u last imaging with CT chest early next year unless Pulm wants earlier f/u\par \par RV 4 mos\par \par  \par  \par

## 2022-07-19 NOTE — PHYSICAL EXAM
[Thin] : thin [Fully active, able to carry on all pre-disease performance without restriction] : Status 0 - Fully active, able to carry on all pre-disease performance without restriction [Normal] : normal spine exam without palpable tenderness, no kyphosis or scoliosis

## 2022-07-20 LAB
25(OH)D3 SERPL-MCNC: 66.3 NG/ML
ALBUMIN SERPL ELPH-MCNC: 4.4 G/DL
ALBUMIN SERPL ELPH-MCNC: 4.6 G/DL
ALBUMIN SERPL ELPH-MCNC: 4.7 G/DL
ALBUMIN SERPL ELPH-MCNC: 4.8 G/DL
ALBUMIN SERPL ELPH-MCNC: 4.8 G/DL
ALP BLD-CCNC: 239 U/L
ALP BLD-CCNC: 53 U/L
ALP BLD-CCNC: 56 U/L
ALP BLD-CCNC: 63 U/L
ALP BLD-CCNC: 64 U/L
ALT SERPL-CCNC: 21 U/L
ALT SERPL-CCNC: 23 U/L
ALT SERPL-CCNC: 24 U/L
ALT SERPL-CCNC: 25 U/L
ALT SERPL-CCNC: 26 U/L
ANION GAP SERPL CALC-SCNC: 11 MMOL/L
ANION GAP SERPL CALC-SCNC: 12 MMOL/L
ANION GAP SERPL CALC-SCNC: 13 MMOL/L
APPEARANCE: CLEAR
AST SERPL-CCNC: 19 U/L
AST SERPL-CCNC: 19 U/L
AST SERPL-CCNC: 20 U/L
AST SERPL-CCNC: 21 U/L
AST SERPL-CCNC: 23 U/L
B2 MICROGLOB SERPL-MCNC: 1.7 MG/L
B2 MICROGLOB SERPL-MCNC: 2 MG/L
B2 MICROGLOB SERPL-MCNC: 2 MG/L
BACTERIA: NEGATIVE
BILIRUB SERPL-MCNC: 0.2 MG/DL
BILIRUB SERPL-MCNC: 0.4 MG/DL
BILIRUB SERPL-MCNC: 0.4 MG/DL
BILIRUB SERPL-MCNC: 0.5 MG/DL
BILIRUB SERPL-MCNC: 0.8 MG/DL
BILIRUBIN URINE: NEGATIVE
BLOOD URINE: NEGATIVE
BUN SERPL-MCNC: 16 MG/DL
BUN SERPL-MCNC: 16 MG/DL
BUN SERPL-MCNC: 19 MG/DL
BUN SERPL-MCNC: 21 MG/DL
BUN SERPL-MCNC: 27 MG/DL
CALCIUM SERPL-MCNC: 9.2 MG/DL
CALCIUM SERPL-MCNC: 9.2 MG/DL
CALCIUM SERPL-MCNC: 9.5 MG/DL
CALCIUM SERPL-MCNC: 9.5 MG/DL
CALCIUM SERPL-MCNC: 9.6 MG/DL
CHLORIDE SERPL-SCNC: 100 MMOL/L
CHLORIDE SERPL-SCNC: 103 MMOL/L
CHLORIDE SERPL-SCNC: 104 MMOL/L
CHLORIDE SERPL-SCNC: 104 MMOL/L
CHLORIDE SERPL-SCNC: 105 MMOL/L
CHOLEST SERPL-MCNC: 130 MG/DL
CHOLEST SERPL-MCNC: 131 MG/DL
CHOLEST SERPL-MCNC: 146 MG/DL
CO2 SERPL-SCNC: 23 MMOL/L
CO2 SERPL-SCNC: 24 MMOL/L
CO2 SERPL-SCNC: 26 MMOL/L
COLOR: YELLOW
COVID-19 SPIKE DOMAIN ANTIBODY INTERPRETATION: POSITIVE
CREAT SERPL-MCNC: 0.58 MG/DL
CREAT SERPL-MCNC: 0.62 MG/DL
CREAT SERPL-MCNC: 0.62 MG/DL
CREAT SERPL-MCNC: 0.64 MG/DL
CREAT SERPL-MCNC: 0.65 MG/DL
CREAT SPEC-SCNC: 67 MG/DL
CRP SERPL-MCNC: 3 MG/L
DEPRECATED KAPPA LC FREE/LAMBDA SER: 0.8 RATIO
DEPRECATED KAPPA LC FREE/LAMBDA SER: 0.83 RATIO
DEPRECATED KAPPA LC FREE/LAMBDA SER: 0.95 RATIO
DEPRECATED KAPPA LC FREE/LAMBDA SER: 0.98 RATIO
DEPRECATED KAPPA LC FREE/LAMBDA SER: 1.2 RATIO
EGFR: 92 ML/MIN/1.73M2
EGFR: 92 ML/MIN/1.73M2
ESTIMATED AVERAGE GLUCOSE: 117 MG/DL
ESTIMATED AVERAGE GLUCOSE: 123 MG/DL
ESTIMATED AVERAGE GLUCOSE: 126 MG/DL
GLUCOSE BS SERPL-MCNC: 109 MG/DL
GLUCOSE QUALITATIVE U: NEGATIVE
GLUCOSE SERPL-MCNC: 117 MG/DL
GLUCOSE SERPL-MCNC: 119 MG/DL
GLUCOSE SERPL-MCNC: 120 MG/DL
GLUCOSE SERPL-MCNC: 133 MG/DL
GLUCOSE SERPL-MCNC: 134 MG/DL
HBA1C MFR BLD HPLC: 5.7 %
HBA1C MFR BLD HPLC: 5.9 %
HBA1C MFR BLD HPLC: 6 %
HDLC SERPL-MCNC: 68 MG/DL
HDLC SERPL-MCNC: 73 MG/DL
HYALINE CASTS: 0 /LPF
IGA SER QL IEP: 70 MG/DL
IGA SER QL IEP: 78 MG/DL
IGA SER QL IEP: 82 MG/DL
IGA SER QL IEP: 83 MG/DL
IGA SER QL IEP: 86 MG/DL
IGG SER QL IEP: 492 MG/DL
IGG SER QL IEP: 529 MG/DL
IGG SER QL IEP: 533 MG/DL
IGG SER QL IEP: 537 MG/DL
IGG SER QL IEP: 816 MG/DL
IGG SUBSET TOTAL IGG: 534 MG/DL
IGG1 SER-MCNC: 261 MG/DL
IGG2 SER-MCNC: 125 MG/DL
IGG3 SER-MCNC: 57 MG/DL
IGG4 SER-MCNC: 9 MG/DL
IGM SER QL IEP: 213 MG/DL
IGM SER QL IEP: 38 MG/DL
IGM SER QL IEP: 41 MG/DL
IGM SER QL IEP: 41 MG/DL
IGM SER QL IEP: 47 MG/DL
KAPPA LC CSF-MCNC: 0.86 MG/DL
KAPPA LC CSF-MCNC: 0.93 MG/DL
KAPPA LC CSF-MCNC: 0.99 MG/DL
KAPPA LC CSF-MCNC: 1.03 MG/DL
KAPPA LC CSF-MCNC: 1.19 MG/DL
KAPPA LC SERPL-MCNC: 0.86 MG/DL
KAPPA LC SERPL-MCNC: 0.88 MG/DL
KAPPA LC SERPL-MCNC: 0.95 MG/DL
KAPPA LC SERPL-MCNC: 0.97 MG/DL
KAPPA LC SERPL-MCNC: 1.03 MG/DL
KETONES URINE: NEGATIVE
LDH SERPL-CCNC: 182 U/L
LDH SERPL-CCNC: 192 U/L
LDH SERPL-CCNC: 198 U/L
LDH SERPL-CCNC: 210 U/L
LDH SERPL-CCNC: 222 U/L
LDLC SERPL CALC-MCNC: 42 MG/DL
LDLC SERPL CALC-MCNC: 65 MG/DL
LEUKOCYTE ESTERASE URINE: NEGATIVE
MICROALBUMIN 24H UR DL<=1MG/L-MCNC: <1.2 MG/DL
MICROALBUMIN/CREAT 24H UR-RTO: NORMAL MG/G
MICROSCOPIC-UA: NORMAL
NITRITE URINE: NEGATIVE
NONHDLC SERPL-MCNC: 58 MG/DL
NONHDLC SERPL-MCNC: 78 MG/DL
PH URINE: 6.5
POTASSIUM SERPL-SCNC: 4.4 MMOL/L
POTASSIUM SERPL-SCNC: 4.5 MMOL/L
POTASSIUM SERPL-SCNC: 4.6 MMOL/L
POTASSIUM SERPL-SCNC: 4.6 MMOL/L
POTASSIUM SERPL-SCNC: 4.7 MMOL/L
PROT SERPL-MCNC: 6.1 G/DL
PROT SERPL-MCNC: 6.3 G/DL
PROT SERPL-MCNC: 6.3 G/DL
PROT SERPL-MCNC: 6.6 G/DL
PROT SERPL-MCNC: 6.6 G/DL
PROTEIN URINE: NEGATIVE
RED BLOOD CELLS URINE: 3 /HPF
SARS-COV-2 AB SERPL IA-ACNC: 5.48 U/ML
SARS-COV-2 AB SERPL IA-ACNC: >250 U/ML
SODIUM SERPL-SCNC: 137 MMOL/L
SODIUM SERPL-SCNC: 140 MMOL/L
SODIUM SERPL-SCNC: 141 MMOL/L
SODIUM SERPL-SCNC: 142 MMOL/L
SODIUM SERPL-SCNC: 143 MMOL/L
SPECIFIC GRAVITY URINE: 1.02
SQUAMOUS EPITHELIAL CELLS: 1 /HPF
T4 FREE SERPL-MCNC: 1.2 NG/DL
TRIGL SERPL-MCNC: 61 MG/DL
TRIGL SERPL-MCNC: 78 MG/DL
TSH SERPL-ACNC: 1.08 UIU/ML
TSH SERPL-ACNC: 1.85 UIU/ML
UROBILINOGEN URINE: NORMAL
VIT B12 SERPL-MCNC: 1006 PG/ML
WHITE BLOOD CELLS URINE: 1 /HPF

## 2022-09-01 ENCOUNTER — APPOINTMENT (OUTPATIENT)
Dept: OBGYN | Facility: CLINIC | Age: 77
End: 2022-09-01

## 2022-09-01 VITALS — DIASTOLIC BLOOD PRESSURE: 80 MMHG | SYSTOLIC BLOOD PRESSURE: 127 MMHG

## 2022-09-01 DIAGNOSIS — R10.2 PELVIC AND PERINEAL PAIN: ICD-10-CM

## 2022-09-01 PROCEDURE — G0101: CPT

## 2022-09-01 NOTE — PLAN
[FreeTextEntry1] : annual gyn\par no  new gyn issue\par \par f/u pap, cuff\par \par will f/u mammo result from earlier in year

## 2022-09-01 NOTE — HISTORY OF PRESENT ILLNESS
[FreeTextEntry1] : 78yo PM woman s/p HERLINDA  here for annual exam\par no gyn complaints\par \par PMH: intersticial lung disease and new marginal  zone lymphoma (stag 4)\par \par Therapist [Mammogramdate] : 2/2022

## 2022-09-01 NOTE — PHYSICAL EXAM
[Chaperone Declined] : Patient declined chaperone [Appropriately responsive] : appropriately responsive [Alert] : alert [No Acute Distress] : no acute distress [No Lymphadenopathy] : no lymphadenopathy [No Murmurs] : no murmurs [Soft] : soft [Non-tender] : non-tender [Non-distended] : non-distended [No HSM] : No HSM [No Lesions] : no lesions [No Mass] : no mass [Oriented x3] : oriented x3 [FreeTextEntry6] : dense [Examination Of The Breasts] : a normal appearance [No Masses] : no breast masses were palpable [Labia Majora] : normal [Labia Minora] : normal [Normal] : normal [Atrophy] : atrophy [Absent] : absent [Uterine Adnexae] : normal

## 2022-09-03 ENCOUNTER — RX RENEWAL (OUTPATIENT)
Age: 77
End: 2022-09-03

## 2022-09-08 LAB — CYTOLOGY CVX/VAG DOC THIN PREP: ABNORMAL

## 2022-09-21 ENCOUNTER — OUTPATIENT (OUTPATIENT)
Dept: OUTPATIENT SERVICES | Facility: HOSPITAL | Age: 77
LOS: 1 days | End: 2022-09-21
Payer: MEDICARE

## 2022-09-21 ENCOUNTER — APPOINTMENT (OUTPATIENT)
Dept: CT IMAGING | Facility: CLINIC | Age: 77
End: 2022-09-21

## 2022-09-21 DIAGNOSIS — C88.4 EXTRANODAL MARGINAL ZONE B-CELL LYMPHOMA OF MUCOSA-ASSOCIATED LYMPHOID TISSUE [MALT-LYMPHOMA]: ICD-10-CM

## 2022-09-21 DIAGNOSIS — R16.1 SPLENOMEGALY, NOT ELSEWHERE CLASSIFIED: ICD-10-CM

## 2022-09-21 PROCEDURE — 71250 CT THORAX DX C-: CPT | Mod: 26,MH

## 2022-09-21 PROCEDURE — 71250 CT THORAX DX C-: CPT

## 2022-10-07 RX ORDER — CLONAZEPAM 2 MG/1
2 TABLET ORAL
Qty: 60 | Refills: 0 | Status: COMPLETED | COMMUNITY
Start: 2022-05-31 | End: 2022-10-07

## 2022-11-15 ENCOUNTER — OUTPATIENT (OUTPATIENT)
Dept: OUTPATIENT SERVICES | Facility: HOSPITAL | Age: 77
LOS: 1 days | Discharge: HOME | End: 2022-11-15

## 2022-11-15 DIAGNOSIS — C85.80 OTHER SPECIFIED TYPES OF NON-HODGKIN LYMPHOMA, UNSPECIFIED SITE: ICD-10-CM

## 2022-11-21 ENCOUNTER — RESULT REVIEW (OUTPATIENT)
Age: 77
End: 2022-11-21

## 2022-11-21 ENCOUNTER — APPOINTMENT (OUTPATIENT)
Dept: HEMATOLOGY ONCOLOGY | Facility: CLINIC | Age: 77
End: 2022-11-21

## 2022-11-21 VITALS
SYSTOLIC BLOOD PRESSURE: 143 MMHG | BODY MASS INDEX: 22.68 KG/M2 | RESPIRATION RATE: 14 BRPM | WEIGHT: 117.04 LBS | TEMPERATURE: 97.3 F | HEART RATE: 83 BPM | DIASTOLIC BLOOD PRESSURE: 71 MMHG | HEIGHT: 60.04 IN | OXYGEN SATURATION: 98 %

## 2022-11-21 LAB
BASOPHILS # BLD AUTO: 0.02 K/UL — SIGNIFICANT CHANGE UP (ref 0–0.2)
BASOPHILS NFR BLD AUTO: 0.3 % — SIGNIFICANT CHANGE UP (ref 0–2)
EOSINOPHIL # BLD AUTO: 0.03 K/UL — SIGNIFICANT CHANGE UP (ref 0–0.5)
EOSINOPHIL NFR BLD AUTO: 0.4 % — SIGNIFICANT CHANGE UP (ref 0–6)
HCT VFR BLD CALC: 40.1 % — SIGNIFICANT CHANGE UP (ref 34.5–45)
HGB BLD-MCNC: 13.7 G/DL — SIGNIFICANT CHANGE UP (ref 11.5–15.5)
IMM GRANULOCYTES NFR BLD AUTO: 0.1 % — SIGNIFICANT CHANGE UP (ref 0–0.9)
LYMPHOCYTES # BLD AUTO: 0.93 K/UL — LOW (ref 1–3.3)
LYMPHOCYTES # BLD AUTO: 13.5 % — SIGNIFICANT CHANGE UP (ref 13–44)
MCHC RBC-ENTMCNC: 32.5 PG — SIGNIFICANT CHANGE UP (ref 27–34)
MCHC RBC-ENTMCNC: 34.2 G/DL — SIGNIFICANT CHANGE UP (ref 32–36)
MCV RBC AUTO: 95 FL — SIGNIFICANT CHANGE UP (ref 80–100)
MONOCYTES # BLD AUTO: 0.47 K/UL — SIGNIFICANT CHANGE UP (ref 0–0.9)
MONOCYTES NFR BLD AUTO: 6.8 % — SIGNIFICANT CHANGE UP (ref 2–14)
NEUTROPHILS # BLD AUTO: 5.41 K/UL — SIGNIFICANT CHANGE UP (ref 1.8–7.4)
NEUTROPHILS NFR BLD AUTO: 78.9 % — HIGH (ref 43–77)
NRBC # BLD: 0 /100 WBCS — SIGNIFICANT CHANGE UP (ref 0–0)
PLATELET # BLD AUTO: 259 K/UL — SIGNIFICANT CHANGE UP (ref 150–400)
RBC # BLD: 4.22 M/UL — SIGNIFICANT CHANGE UP (ref 3.8–5.2)
RBC # FLD: 13.1 % — SIGNIFICANT CHANGE UP (ref 10.3–14.5)
WBC # BLD: 6.87 K/UL — SIGNIFICANT CHANGE UP (ref 3.8–10.5)
WBC # FLD AUTO: 6.87 K/UL — SIGNIFICANT CHANGE UP (ref 3.8–10.5)

## 2022-11-21 PROCEDURE — 99214 OFFICE O/P EST MOD 30 MIN: CPT

## 2022-11-23 RX ORDER — METFORMIN ER 500 MG 500 MG/1
500 TABLET ORAL
Qty: 30 | Refills: 0 | Status: ACTIVE | COMMUNITY
Start: 2022-10-27

## 2022-11-23 RX ORDER — AMOXICILLIN 500 MG/1
500 CAPSULE ORAL
Qty: 16 | Refills: 0 | Status: COMPLETED | COMMUNITY
Start: 2022-09-09

## 2022-11-23 RX ORDER — AMOXICILLIN AND CLAVULANATE POTASSIUM 875; 125 MG/1; MG/1
875-125 TABLET, COATED ORAL
Qty: 20 | Refills: 0 | Status: COMPLETED | COMMUNITY
Start: 2022-10-26

## 2022-11-23 NOTE — CONSULT LETTER
[DrJuan  ___] : Dr. ANDRES [DrJuan ___] : Dr. ANDRES [FreeTextEntry3] : [facility] West [facility]., [facility]\par

## 2022-11-23 NOTE — HISTORY OF PRESENT ILLNESS
[Disease:__________________________] : Disease: [unfilled] [de-identified] : Ms. 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  Splenomegaly was an incidental finding on a chest CT from 8/2020 which demonstrated bilateral groundglass infiltrates.  She is being followed by Dr. Anne for this.  Abdominal ultrasound from 11/5/2020 showed the spleen measuring 14.5 cm.  A 0.9 cm splenule was seen.  PET/CT showed the mild splenomegaly as well as diffuse low-level SUV positivity.  Pulmonary uptake was not described.  A tiny RUL lung nodule was seen which was too small to characterize. \par Bone marrow aspirate and biopsy on 9/30/2020 showed a hypercellular marrow involved by a CD5 and CD10 negative low-grade B-cell lymphoma, which made up about 10% of marrow cells.  By flow, there was a small (2.5%) monotypic B-cell population expressing kappa, CD19, CD20, CD22, CD11c (dim), FMC 7, minimal CD25 and negative for CD5, CD10, CD23 and .  CLL FISH panel was negative, including TP53.  FISH for BIRC3/MALT1 was also negative.\par CBC, differential and platelets have been for the most part normal.  Beta-2 MG was 2.7 on 10/6/2020.  CMP at that time was normal.  LDH was normal. \par Her major complaints are GI related: significant reflux sxs as well as early satiety.\par She has no constitutional complaints.\par  [FreeTextEntry1] : MZL Sandy 3/5/2021  [de-identified] : marginal zone Lymphoma - completed weekly Rituxan 3/2021 \par PET CT 6/2021 resolution of splenomegaly, no pulm findings\par Last CT chest 9/2022 - stable pulm findings,  stable bibasilar reticular opacities \par URI, cough, recently treated with abs\par hypogamma - no recent URI or pulmonary infections\par has SAJAN, has CPAP, mod fatigue\par no constitutional c/o

## 2022-11-23 NOTE — ASSESSMENT
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 ILD may have responded to rituxan  \par  \par  CBC results reviewed and d/w pt\par WBC 6.87, Hgb 13.7, Plt 259K, nl diff except mild\par lymphocytopenia (0.93)\par \par Hypogamma: Conflicting opinions re prophylactic IVIG - saw two immunologists, one recommended preemptive use even with several yrs w/o serious sinopulmonary infections, another recommended using IVIG only if infectious problems recur\par Heme practice has been similar to second opinion - e.g. to hold on IVIG for now\par She did respond to Covid vaccines, + Allen Ab > 250\par \par check CMP, LDH, beta 2 MG, Igs\par Pulm f/u with Dr. Anne\par repeat  CT chest 3/2023 unless Pulm wants earlier f/u\par \par RV 4 mos\par \par  \par  \par

## 2022-11-23 NOTE — REVIEW OF SYSTEMS
[Shortness Of Breath] : shortness of breath [Joint Pain] : joint pain [Negative] : Allergic/Immunologic [Cough] : cough [FreeTextEntry6] : poss in part related to GERD [FreeTextEntry9] : chronic neck pain unchanged

## 2023-01-17 RX ORDER — CLONAZEPAM 2 MG/1
2 TABLET ORAL
Qty: 60 | Refills: 0 | Status: DISCONTINUED | COMMUNITY
Start: 2021-06-08 | End: 2023-01-17

## 2023-01-17 RX ORDER — CLONAZEPAM 2 MG/1
2 TABLET ORAL
Qty: 60 | Refills: 0 | Status: DISCONTINUED | COMMUNITY
Start: 2022-10-07 | End: 2023-01-17

## 2023-01-17 RX ORDER — CLONAZEPAM 1 MG/1
1 TABLET ORAL
Qty: 60 | Refills: 0 | Status: DISCONTINUED | COMMUNITY
Start: 2022-11-28 | End: 2023-01-17

## 2023-02-17 RX ORDER — TRAZODONE HYDROCHLORIDE 50 MG/1
50 TABLET ORAL
Qty: 30 | Refills: 3 | Status: ACTIVE | COMMUNITY
Start: 2022-12-22 | End: 1900-01-01

## 2023-03-29 ENCOUNTER — OUTPATIENT (OUTPATIENT)
Dept: OUTPATIENT SERVICES | Facility: HOSPITAL | Age: 78
LOS: 1 days | Discharge: HOME | End: 2023-03-29

## 2023-03-29 DIAGNOSIS — C85.80 OTHER SPECIFIED TYPES OF NON-HODGKIN LYMPHOMA, UNSPECIFIED SITE: ICD-10-CM

## 2023-04-03 ENCOUNTER — APPOINTMENT (OUTPATIENT)
Dept: HEMATOLOGY ONCOLOGY | Facility: CLINIC | Age: 78
End: 2023-04-03

## 2023-04-03 ENCOUNTER — APPOINTMENT (OUTPATIENT)
Dept: HEMATOLOGY ONCOLOGY | Facility: CLINIC | Age: 78
End: 2023-04-03
Payer: MEDICARE

## 2023-04-03 ENCOUNTER — RESULT REVIEW (OUTPATIENT)
Age: 78
End: 2023-04-03

## 2023-04-03 VITALS
SYSTOLIC BLOOD PRESSURE: 121 MMHG | BODY MASS INDEX: 22.57 KG/M2 | TEMPERATURE: 97.2 F | RESPIRATION RATE: 16 BRPM | DIASTOLIC BLOOD PRESSURE: 72 MMHG | HEART RATE: 94 BPM | WEIGHT: 115.74 LBS | OXYGEN SATURATION: 98 %

## 2023-04-03 DIAGNOSIS — G47.33 OBSTRUCTIVE SLEEP APNEA (ADULT) (PEDIATRIC): ICD-10-CM

## 2023-04-03 DIAGNOSIS — Z92.89 PERSONAL HISTORY OF OTHER MEDICAL TREATMENT: ICD-10-CM

## 2023-04-03 DIAGNOSIS — K21.9 GASTRO-ESOPHAGEAL REFLUX DISEASE W/OUT ESOPHAGITIS: ICD-10-CM

## 2023-04-03 LAB
BASOPHILS # BLD AUTO: 0.03 K/UL — SIGNIFICANT CHANGE UP (ref 0–0.2)
BASOPHILS NFR BLD AUTO: 0.6 % — SIGNIFICANT CHANGE UP (ref 0–2)
EOSINOPHIL # BLD AUTO: 0.05 K/UL — SIGNIFICANT CHANGE UP (ref 0–0.5)
EOSINOPHIL NFR BLD AUTO: 0.9 % — SIGNIFICANT CHANGE UP (ref 0–6)
HCT VFR BLD CALC: 43.7 % — SIGNIFICANT CHANGE UP (ref 34.5–45)
HGB BLD-MCNC: 14.6 G/DL — SIGNIFICANT CHANGE UP (ref 11.5–15.5)
IMM GRANULOCYTES NFR BLD AUTO: 0.4 % — SIGNIFICANT CHANGE UP (ref 0–0.9)
LYMPHOCYTES # BLD AUTO: 1.13 K/UL — SIGNIFICANT CHANGE UP (ref 1–3.3)
LYMPHOCYTES # BLD AUTO: 21.2 % — SIGNIFICANT CHANGE UP (ref 13–44)
MCHC RBC-ENTMCNC: 32.2 PG — SIGNIFICANT CHANGE UP (ref 27–34)
MCHC RBC-ENTMCNC: 33.4 G/DL — SIGNIFICANT CHANGE UP (ref 32–36)
MCV RBC AUTO: 96.5 FL — SIGNIFICANT CHANGE UP (ref 80–100)
MONOCYTES # BLD AUTO: 0.52 K/UL — SIGNIFICANT CHANGE UP (ref 0–0.9)
MONOCYTES NFR BLD AUTO: 9.7 % — SIGNIFICANT CHANGE UP (ref 2–14)
NEUTROPHILS # BLD AUTO: 3.59 K/UL — SIGNIFICANT CHANGE UP (ref 1.8–7.4)
NEUTROPHILS NFR BLD AUTO: 67.2 % — SIGNIFICANT CHANGE UP (ref 43–77)
NRBC # BLD: 0 /100 WBCS — SIGNIFICANT CHANGE UP (ref 0–0)
PLATELET # BLD AUTO: 248 K/UL — SIGNIFICANT CHANGE UP (ref 150–400)
RBC # BLD: 4.53 M/UL — SIGNIFICANT CHANGE UP (ref 3.8–5.2)
RBC # FLD: 12.8 % — SIGNIFICANT CHANGE UP (ref 10.3–14.5)
WBC # BLD: 5.34 K/UL — SIGNIFICANT CHANGE UP (ref 3.8–10.5)
WBC # FLD AUTO: 5.34 K/UL — SIGNIFICANT CHANGE UP (ref 3.8–10.5)

## 2023-04-03 PROCEDURE — 99215 OFFICE O/P EST HI 40 MIN: CPT

## 2023-04-05 PROBLEM — G47.33 OBSTRUCTIVE SLEEP APNEA: Status: ACTIVE | Noted: 2021-11-01

## 2023-04-05 PROBLEM — Z92.89 HISTORY OF IMMUNOTHERAPY: Status: ACTIVE | Noted: 2022-11-23

## 2023-04-05 PROBLEM — K21.9 GERD WITHOUT ESOPHAGITIS: Status: ACTIVE | Noted: 2017-04-13

## 2023-04-05 RX ORDER — CEPHALEXIN 500 MG/1
500 CAPSULE ORAL
Qty: 30 | Refills: 0 | Status: COMPLETED | COMMUNITY
Start: 2023-03-19

## 2023-04-05 NOTE — CONSULT LETTER
[DrJuan  ___] : Dr. ANDRES [DrJuan ___] : Dr. ANDRES [FreeTextEntry3] : James West M.D., [facility]\par

## 2023-04-05 NOTE — ASSESSMENT
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 ILD may have responded to rituxan  \par  \par CBC results reviewed and d/w pt\par WBC 5.38, Hgb 14.8, Plt 280K, nl diff \par mild lymphocytopenia reversed\par \par Hypogamma: Conflicting opinions re prophylactic IVIG - saw two immunologists, one recommended preemptive use even with several yrs w/o serious sinopulmonary infections, another recommended using IVIG only if infectious problems recur\par Heme practice has been similar to second opinion - e.g. to hold on IVIG for now\par 11/2022 Ig 526, sl decr IgA and nl IgM\par Significant incr in IgG from lowest level of 389 in 12/2020\par She did respond to Covid vaccines, + Allen Ab > 250\par \par check CMP, LDH, beta 2 MG, Igs\par Pulm f/u with Dr. Anne\par \par repeat  CT chest in next few weeks\par if stable,can extend f/u intervals bet scans\par \par RV 4 mos\par \par  \par  \par

## 2023-04-05 NOTE — REVIEW OF SYSTEMS
[Cough] : cough [Joint Pain] : joint pain [Negative] : Respiratory [FreeTextEntry6] : intermittent cough, asthma on meds [FreeTextEntry7] : GERD well controlled [FreeTextEntry9] : chronic neck pain unchanged

## 2023-04-05 NOTE — HISTORY OF PRESENT ILLNESS
[Disease:__________________________] : Disease: [unfilled] [de-identified] : Ms. 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  Splenomegaly was an incidental finding on a chest CT from 8/2020 which demonstrated bilateral groundglass infiltrates.  She is being followed by Dr. Anne for this.  Abdominal ultrasound from 11/5/2020 showed the spleen measuring 14.5 cm.  A 0.9 cm splenule was seen.  PET/CT showed the mild splenomegaly as well as diffuse low-level SUV positivity.  Pulmonary uptake was not described.  A tiny RUL lung nodule was seen which was too small to characterize. \par Bone marrow aspirate and biopsy on 9/30/2020 showed a hypercellular marrow involved by a CD5 and CD10 negative low-grade B-cell lymphoma, which made up about 10% of marrow cells.  By flow, there was a small (2.5%) monotypic B-cell population expressing kappa, CD19, CD20, CD22, CD11c (dim), FMC 7, minimal CD25 and negative for CD5, CD10, CD23 and .  CLL FISH panel was negative, including TP53.  FISH for BIRC3/MALT1 was also negative.\par CBC, differential and platelets have been for the most part normal.  Beta-2 MG was 2.7 on 10/6/2020.  CMP at that time was normal.  LDH was normal. \par Her major complaints are GI related: significant reflux sxs as well as early satiety.\par She has no constitutional complaints.\par  [FreeTextEntry1] : MZL Sandy 3/5/2021  [de-identified] : marginal zone Lymphoma - completed weekly Rituxan 3/2021 \par PET CT 6/2021 resolution of splenomegaly, no pulm findings\par Last CT chest 9/2022 - stable pulm findings,  stable bibasilar reticular opacities \par no new pulmonary c/o; asthma managed with inhalers, PFTs stable, recently saw Dr. Anne\par hypogamma - no recent URI or pulmonary infections\par has SAJAN, uses CPAP, mod fatigue\par no constitutional c/o

## 2023-04-11 ENCOUNTER — NON-APPOINTMENT (OUTPATIENT)
Age: 78
End: 2023-04-11

## 2023-04-17 ENCOUNTER — OUTPATIENT (OUTPATIENT)
Dept: OUTPATIENT SERVICES | Facility: HOSPITAL | Age: 78
LOS: 1 days | End: 2023-04-17
Payer: MEDICARE

## 2023-04-17 ENCOUNTER — APPOINTMENT (OUTPATIENT)
Dept: CT IMAGING | Facility: CLINIC | Age: 78
End: 2023-04-17
Payer: MEDICARE

## 2023-04-17 DIAGNOSIS — R93.89 ABNORMAL FINDINGS ON DIAGNOSTIC IMAGING OF OTHER SPECIFIED BODY STRUCTURES: ICD-10-CM

## 2023-04-17 PROCEDURE — 71250 CT THORAX DX C-: CPT | Mod: 26,MH

## 2023-04-17 PROCEDURE — 71250 CT THORAX DX C-: CPT

## 2023-04-24 RX ORDER — ESCITALOPRAM OXALATE 10 MG/1
10 TABLET ORAL
Qty: 30 | Refills: 4 | Status: ACTIVE | COMMUNITY
Start: 2021-02-19 | End: 1900-01-01

## 2023-09-19 ENCOUNTER — APPOINTMENT (OUTPATIENT)
Dept: OBGYN | Facility: CLINIC | Age: 78
End: 2023-09-19
Payer: MEDICARE

## 2023-09-19 VITALS
BODY MASS INDEX: 22.19 KG/M2 | SYSTOLIC BLOOD PRESSURE: 110 MMHG | HEIGHT: 60 IN | WEIGHT: 113 LBS | DIASTOLIC BLOOD PRESSURE: 60 MMHG

## 2023-09-19 DIAGNOSIS — Z86.19 PERSONAL HISTORY OF OTHER INFECTIOUS AND PARASITIC DISEASES: ICD-10-CM

## 2023-09-19 DIAGNOSIS — Z01.419 ENCOUNTER FOR GYNECOLOGICAL EXAMINATION (GENERAL) (ROUTINE) W/OUT ABNORMAL FINDINGS: ICD-10-CM

## 2023-09-19 PROCEDURE — G0101: CPT

## 2023-09-25 ENCOUNTER — OUTPATIENT (OUTPATIENT)
Dept: OUTPATIENT SERVICES | Facility: HOSPITAL | Age: 78
LOS: 1 days | Discharge: HOME | End: 2023-09-25

## 2023-09-25 DIAGNOSIS — C85.80 OTHER SPECIFIED TYPES OF NON-HODGKIN LYMPHOMA, UNSPECIFIED SITE: ICD-10-CM

## 2023-09-27 ENCOUNTER — APPOINTMENT (OUTPATIENT)
Dept: PALLIATIVE MEDICINE | Facility: CLINIC | Age: 78
End: 2023-09-27
Payer: MEDICARE

## 2023-09-27 DIAGNOSIS — Z51.5 ENCOUNTER FOR PALLIATIVE CARE: ICD-10-CM

## 2023-09-27 DIAGNOSIS — G47.00 INSOMNIA, UNSPECIFIED: ICD-10-CM

## 2023-09-27 DIAGNOSIS — F41.9 ANXIETY DISORDER, UNSPECIFIED: ICD-10-CM

## 2023-09-27 PROCEDURE — 99205 OFFICE O/P NEW HI 60 MIN: CPT | Mod: 95

## 2023-10-02 LAB — CYTOLOGY CVX/VAG DOC THIN PREP: ABNORMAL

## 2023-10-03 ENCOUNTER — RESULT REVIEW (OUTPATIENT)
Age: 78
End: 2023-10-03

## 2023-10-03 ENCOUNTER — APPOINTMENT (OUTPATIENT)
Dept: HEMATOLOGY ONCOLOGY | Facility: CLINIC | Age: 78
End: 2023-10-03
Payer: MEDICARE

## 2023-10-03 VITALS
TEMPERATURE: 97.3 F | DIASTOLIC BLOOD PRESSURE: 67 MMHG | SYSTOLIC BLOOD PRESSURE: 141 MMHG | OXYGEN SATURATION: 98 % | BODY MASS INDEX: 22.22 KG/M2 | RESPIRATION RATE: 14 BRPM | HEART RATE: 56 BPM | WEIGHT: 113.76 LBS

## 2023-10-03 VITALS — SYSTOLIC BLOOD PRESSURE: 146 MMHG | DIASTOLIC BLOOD PRESSURE: 74 MMHG

## 2023-10-03 DIAGNOSIS — Z87.898 PERSONAL HISTORY OF OTHER SPECIFIED CONDITIONS: ICD-10-CM

## 2023-10-03 LAB
BASOPHILS # BLD AUTO: 0.03 K/UL — SIGNIFICANT CHANGE UP (ref 0–0.2)
BASOPHILS NFR BLD AUTO: 0.5 % — SIGNIFICANT CHANGE UP (ref 0–2)
EOSINOPHIL # BLD AUTO: 0.04 K/UL — SIGNIFICANT CHANGE UP (ref 0–0.5)
EOSINOPHIL NFR BLD AUTO: 0.7 % — SIGNIFICANT CHANGE UP (ref 0–6)
HCT VFR BLD CALC: 41.8 % — SIGNIFICANT CHANGE UP (ref 34.5–45)
HGB BLD-MCNC: 13.8 G/DL — SIGNIFICANT CHANGE UP (ref 11.5–15.5)
IMM GRANULOCYTES NFR BLD AUTO: 0.5 % — SIGNIFICANT CHANGE UP (ref 0–0.9)
LYMPHOCYTES # BLD AUTO: 1.33 K/UL — SIGNIFICANT CHANGE UP (ref 1–3.3)
LYMPHOCYTES # BLD AUTO: 23.7 % — SIGNIFICANT CHANGE UP (ref 13–44)
MCHC RBC-ENTMCNC: 31.9 PG — SIGNIFICANT CHANGE UP (ref 27–34)
MCHC RBC-ENTMCNC: 33 G/DL — SIGNIFICANT CHANGE UP (ref 32–36)
MCV RBC AUTO: 96.8 FL — SIGNIFICANT CHANGE UP (ref 80–100)
MONOCYTES # BLD AUTO: 0.49 K/UL — SIGNIFICANT CHANGE UP (ref 0–0.9)
MONOCYTES NFR BLD AUTO: 8.7 % — SIGNIFICANT CHANGE UP (ref 2–14)
NEUTROPHILS # BLD AUTO: 3.7 K/UL — SIGNIFICANT CHANGE UP (ref 1.8–7.4)
NEUTROPHILS NFR BLD AUTO: 65.9 % — SIGNIFICANT CHANGE UP (ref 43–77)
NRBC # BLD: 0 /100 WBCS — SIGNIFICANT CHANGE UP (ref 0–0)
PLATELET # BLD AUTO: 296 K/UL — SIGNIFICANT CHANGE UP (ref 150–400)
RBC # BLD: 4.32 M/UL — SIGNIFICANT CHANGE UP (ref 3.8–5.2)
RBC # FLD: 12.8 % — SIGNIFICANT CHANGE UP (ref 10.3–14.5)
WBC # BLD: 5.62 K/UL — SIGNIFICANT CHANGE UP (ref 3.8–10.5)
WBC # FLD AUTO: 5.62 K/UL — SIGNIFICANT CHANGE UP (ref 3.8–10.5)

## 2023-10-03 PROCEDURE — 99214 OFFICE O/P EST MOD 30 MIN: CPT

## 2024-03-19 ENCOUNTER — OUTPATIENT (OUTPATIENT)
Dept: OUTPATIENT SERVICES | Facility: HOSPITAL | Age: 79
LOS: 1 days | Discharge: HOME | End: 2024-03-19

## 2024-03-19 DIAGNOSIS — C85.80 OTHER SPECIFIED TYPES OF NON-HODGKIN LYMPHOMA, UNSPECIFIED SITE: ICD-10-CM

## 2024-03-26 ENCOUNTER — RESULT REVIEW (OUTPATIENT)
Age: 79
End: 2024-03-26

## 2024-03-26 ENCOUNTER — APPOINTMENT (OUTPATIENT)
Dept: HEMATOLOGY ONCOLOGY | Facility: CLINIC | Age: 79
End: 2024-03-26
Payer: MEDICARE

## 2024-03-26 VITALS
TEMPERATURE: 97.2 F | DIASTOLIC BLOOD PRESSURE: 80 MMHG | OXYGEN SATURATION: 96 % | SYSTOLIC BLOOD PRESSURE: 130 MMHG | HEART RATE: 16 BPM | BODY MASS INDEX: 22.26 KG/M2 | WEIGHT: 113.98 LBS | RESPIRATION RATE: 16 BRPM

## 2024-03-26 DIAGNOSIS — J84.9 INTERSTITIAL PULMONARY DISEASE, UNSPECIFIED: ICD-10-CM

## 2024-03-26 DIAGNOSIS — R05.3 CHRONIC COUGH: ICD-10-CM

## 2024-03-26 DIAGNOSIS — C88.4 EXTRANODAL MARGINAL ZONE B-CELL LYMPHOMA OF MUCOSA-ASSOCIATED LYMPHOID TISSUE [MALT-LYMPHOMA]: ICD-10-CM

## 2024-03-26 DIAGNOSIS — R93.89 ABNORMAL FINDINGS ON DIAGNOSTIC IMAGING OF OTHER SPECIFIED BODY STRUCTURES: ICD-10-CM

## 2024-03-26 DIAGNOSIS — D80.1 NONFAMILIAL HYPOGAMMAGLOBULINEMIA: ICD-10-CM

## 2024-03-26 DIAGNOSIS — Z92.22 PERSONAL HISTORY OF MONOCLONAL DRUG THERAPY: ICD-10-CM

## 2024-03-26 LAB
25(OH)D3 SERPL-MCNC: 65.9 NG/ML
ALBUMIN SERPL ELPH-MCNC: 4.4 G/DL
ALBUMIN SERPL ELPH-MCNC: 4.7 G/DL
ALBUMIN SERPL ELPH-MCNC: 4.9 G/DL
ALP BLD-CCNC: 54 U/L
ALP BLD-CCNC: 58 U/L
ALP BLD-CCNC: 63 U/L
ALT SERPL-CCNC: 25 U/L
ALT SERPL-CCNC: 27 U/L
ALT SERPL-CCNC: 29 U/L
ANION GAP SERPL CALC-SCNC: 11 MMOL/L
ANION GAP SERPL CALC-SCNC: 13 MMOL/L
ANION GAP SERPL CALC-SCNC: 13 MMOL/L
AST SERPL-CCNC: 20 U/L
AST SERPL-CCNC: 21 U/L
AST SERPL-CCNC: 22 U/L
BASOPHILS # BLD AUTO: 0.02 K/UL
BASOPHILS # BLD AUTO: 0.03 K/UL — SIGNIFICANT CHANGE UP (ref 0–0.2)
BASOPHILS NFR BLD AUTO: 0.4 %
BASOPHILS NFR BLD AUTO: 0.5 % — SIGNIFICANT CHANGE UP (ref 0–2)
BILIRUB SERPL-MCNC: 0.2 MG/DL
BILIRUB SERPL-MCNC: 0.3 MG/DL
BILIRUB SERPL-MCNC: 0.4 MG/DL
BUN SERPL-MCNC: 22 MG/DL
BUN SERPL-MCNC: 24 MG/DL
BUN SERPL-MCNC: 34 MG/DL
CALCIUM SERPL-MCNC: 10.2 MG/DL
CALCIUM SERPL-MCNC: 9.4 MG/DL
CALCIUM SERPL-MCNC: 9.8 MG/DL
CHLORIDE SERPL-SCNC: 102 MMOL/L
CHLORIDE SERPL-SCNC: 103 MMOL/L
CHLORIDE SERPL-SCNC: 103 MMOL/L
CHOLEST SERPL-MCNC: 159 MG/DL
CO2 SERPL-SCNC: 25 MMOL/L
CO2 SERPL-SCNC: 29 MMOL/L
CO2 SERPL-SCNC: 29 MMOL/L
COVID-19 SPIKE DOMAIN ANTIBODY INTERPRETATION: POSITIVE
CREAT SERPL-MCNC: 0.58 MG/DL
CREAT SERPL-MCNC: 0.6 MG/DL
CREAT SERPL-MCNC: 0.63 MG/DL
DEPRECATED KAPPA LC FREE/LAMBDA SER: 1.02 RATIO
DEPRECATED KAPPA LC FREE/LAMBDA SER: 1.1 RATIO
DEPRECATED KAPPA LC FREE/LAMBDA SER: 1.34 RATIO
EGFR: 91 ML/MIN/1.73M2
EGFR: 92 ML/MIN/1.73M2
EGFR: 94 ML/MIN/1.73M2
EOSINOPHIL # BLD AUTO: 0.06 K/UL
EOSINOPHIL # BLD AUTO: 0.08 K/UL — SIGNIFICANT CHANGE UP (ref 0–0.5)
EOSINOPHIL NFR BLD AUTO: 1.1 %
EOSINOPHIL NFR BLD AUTO: 1.2 % — SIGNIFICANT CHANGE UP (ref 0–6)
ESTIMATED AVERAGE GLUCOSE: 126 MG/DL
ESTIMATED AVERAGE GLUCOSE: 131 MG/DL
FERRITIN SERPL-MCNC: 100 NG/ML
FOLATE SERPL-MCNC: >20 NG/ML
GLUCOSE SERPL-MCNC: 131 MG/DL
GLUCOSE SERPL-MCNC: 134 MG/DL
GLUCOSE SERPL-MCNC: 147 MG/DL
HBA1C MFR BLD HPLC: 6 %
HBA1C MFR BLD HPLC: 6.2 %
HCT VFR BLD CALC: 42.9 % — SIGNIFICANT CHANGE UP (ref 34.5–45)
HCT VFR BLD CALC: 44.1 %
HDLC SERPL-MCNC: 81 MG/DL
HGB BLD-MCNC: 14.3 G/DL — SIGNIFICANT CHANGE UP (ref 11.5–15.5)
HGB BLD-MCNC: 14.8 G/DL
IGA SER QL IEP: 79 MG/DL
IGA SER QL IEP: 97 MG/DL
IGA SER QL IEP: 99 MG/DL
IGG SER QL IEP: 526 MG/DL
IGG SER QL IEP: 535 MG/DL
IGG SER QL IEP: 576 MG/DL
IGM SER QL IEP: 54 MG/DL
IGM SER QL IEP: 55 MG/DL
IGM SER QL IEP: 96 MG/DL
IMM GRANULOCYTES NFR BLD AUTO: 0.2 %
IMM GRANULOCYTES NFR BLD AUTO: 0.5 % — SIGNIFICANT CHANGE UP (ref 0–0.9)
IRON SATN MFR SERPL: 22 %
IRON SERPL-MCNC: 69 UG/DL
KAPPA LC CSF-MCNC: 1.03 MG/DL
KAPPA LC CSF-MCNC: 1.12 MG/DL
KAPPA LC CSF-MCNC: 1.18 MG/DL
KAPPA LC SERPL-MCNC: 1.13 MG/DL
KAPPA LC SERPL-MCNC: 1.2 MG/DL
KAPPA LC SERPL-MCNC: 1.5 MG/DL
LDH SERPL-CCNC: 191 U/L
LDH SERPL-CCNC: 218 U/L
LDLC SERPL CALC-MCNC: 64 MG/DL
LYMPHOCYTES # BLD AUTO: 1.17 K/UL
LYMPHOCYTES # BLD AUTO: 1.34 K/UL — SIGNIFICANT CHANGE UP (ref 1–3.3)
LYMPHOCYTES # BLD AUTO: 20.6 % — SIGNIFICANT CHANGE UP (ref 13–44)
LYMPHOCYTES NFR BLD AUTO: 21.7 %
MAN DIFF?: NORMAL
MCHC RBC-ENTMCNC: 32.3 PG — SIGNIFICANT CHANGE UP (ref 27–34)
MCHC RBC-ENTMCNC: 32.5 PG
MCHC RBC-ENTMCNC: 33.3 G/DL — SIGNIFICANT CHANGE UP (ref 32–36)
MCHC RBC-ENTMCNC: 33.6 GM/DL
MCV RBC AUTO: 96.7 FL
MCV RBC AUTO: 96.8 FL — SIGNIFICANT CHANGE UP (ref 80–100)
MONOCYTES # BLD AUTO: 0.49 K/UL
MONOCYTES # BLD AUTO: 0.61 K/UL — SIGNIFICANT CHANGE UP (ref 0–0.9)
MONOCYTES NFR BLD AUTO: 9.1 %
MONOCYTES NFR BLD AUTO: 9.4 % — SIGNIFICANT CHANGE UP (ref 2–14)
NEUTROPHILS # BLD AUTO: 3.63 K/UL
NEUTROPHILS # BLD AUTO: 4.4 K/UL — SIGNIFICANT CHANGE UP (ref 1.8–7.4)
NEUTROPHILS NFR BLD AUTO: 67.5 %
NEUTROPHILS NFR BLD AUTO: 67.8 % — SIGNIFICANT CHANGE UP (ref 43–77)
NONHDLC SERPL-MCNC: 79 MG/DL
NRBC # BLD: 0 /100 WBCS — SIGNIFICANT CHANGE UP (ref 0–0)
PLATELET # BLD AUTO: 272 K/UL — SIGNIFICANT CHANGE UP (ref 150–400)
PLATELET # BLD AUTO: 280 K/UL
POTASSIUM SERPL-SCNC: 4.2 MMOL/L
POTASSIUM SERPL-SCNC: 4.3 MMOL/L
POTASSIUM SERPL-SCNC: 4.5 MMOL/L
PROT SERPL-MCNC: 6.4 G/DL
PROT SERPL-MCNC: 6.5 G/DL
PROT SERPL-MCNC: 6.5 G/DL
RBC # BLD: 4.43 M/UL — SIGNIFICANT CHANGE UP (ref 3.8–5.2)
RBC # BLD: 4.56 M/UL
RBC # FLD: 12.9 % — SIGNIFICANT CHANGE UP (ref 10.3–14.5)
RBC # FLD: 13 %
SARS-COV-2 AB SERPL IA-ACNC: >250 U/ML
SODIUM SERPL-SCNC: 142 MMOL/L
SODIUM SERPL-SCNC: 143 MMOL/L
SODIUM SERPL-SCNC: 143 MMOL/L
T3 SERPL-MCNC: 89 NG/DL
T4 SERPL-MCNC: 7.3 UG/DL
TIBC SERPL-MCNC: 310 UG/DL
TRIGL SERPL-MCNC: 71 MG/DL
TSH SERPL-ACNC: 1.29 UIU/ML
UIBC SERPL-MCNC: 242 UG/DL
VIT B12 SERPL-MCNC: 1041 PG/ML
VIT B12 SERPL-MCNC: 1137 PG/ML
VIT B12 SERPL-MCNC: 1391 PG/ML
VZV AB TITR SER: POSITIVE
VZV IGG SER IF-ACNC: 2964 INDEX
WBC # BLD: 6.49 K/UL — SIGNIFICANT CHANGE UP (ref 3.8–10.5)
WBC # FLD AUTO: 5.38 K/UL
WBC # FLD AUTO: 6.49 K/UL — SIGNIFICANT CHANGE UP (ref 3.8–10.5)

## 2024-03-26 PROCEDURE — 99214 OFFICE O/P EST MOD 30 MIN: CPT

## 2024-03-26 PROCEDURE — G2211 COMPLEX E/M VISIT ADD ON: CPT

## 2024-03-26 RX ORDER — BENZONATATE 200 MG/1
200 CAPSULE ORAL
Qty: 60 | Refills: 0 | Status: DISCONTINUED | COMMUNITY
Start: 2022-11-04 | End: 2024-03-26

## 2024-03-26 RX ORDER — CEFUROXIME AXETIL 250 MG/1
250 TABLET ORAL
Qty: 20 | Refills: 0 | Status: DISCONTINUED | COMMUNITY
Start: 2022-10-27 | End: 2024-03-26

## 2024-03-26 RX ORDER — CLONAZEPAM 2 MG/1
2 TABLET ORAL
Qty: 60 | Refills: 0 | Status: DISCONTINUED | COMMUNITY
Start: 2021-02-19 | End: 2024-03-26

## 2024-03-26 RX ORDER — ASPIRIN 81 MG/1
81 TABLET ORAL
Refills: 0 | Status: ACTIVE | COMMUNITY

## 2024-03-26 RX ORDER — CLONAZEPAM 1 MG/1
1 TABLET ORAL
Qty: 60 | Refills: 0 | Status: DISCONTINUED | COMMUNITY
Start: 2023-01-17 | End: 2024-03-26

## 2024-03-26 RX ORDER — TRIAMCINOLONE ACETONIDE 1 MG/G
0.1 OINTMENT TOPICAL
Qty: 80 | Refills: 0 | Status: DISCONTINUED | COMMUNITY
Start: 2022-06-19 | End: 2024-03-26

## 2024-03-27 PROBLEM — R05.3 CHRONIC COUGH: Status: ACTIVE | Noted: 2017-04-13

## 2024-03-27 PROBLEM — R93.89 ABNORMAL CHEST CT: Status: ACTIVE | Noted: 2018-08-23

## 2024-03-27 PROBLEM — J84.9 INTERSTITIAL LUNG DISEASE: Status: ACTIVE | Noted: 2018-09-14

## 2024-03-27 PROBLEM — D80.1 HYPOGAMMAGLOBULINEMIA: Status: ACTIVE | Noted: 2020-12-21

## 2024-03-27 PROBLEM — C88.4 EXTRANODAL MARGINAL ZONE B-CELL LYMPHOMA: Status: ACTIVE | Noted: 2021-02-11

## 2024-03-27 PROBLEM — Z92.22 HISTORY OF MONOCLONAL DRUG THERAPY: Status: ACTIVE | Noted: 2024-03-27

## 2024-03-27 LAB
ALBUMIN SERPL ELPH-MCNC: 4.6 G/DL
ALP BLD-CCNC: 55 U/L
ALT SERPL-CCNC: 24 U/L
AMYLASE/CREAT SERPL: 113 U/L
ANION GAP SERPL CALC-SCNC: 14 MMOL/L
AST SERPL-CCNC: 22 U/L
BILIRUB SERPL-MCNC: 0.4 MG/DL
BUN SERPL-MCNC: 34 MG/DL
CALCIUM SERPL-MCNC: 9.3 MG/DL
CHLORIDE SERPL-SCNC: 105 MMOL/L
CO2 SERPL-SCNC: 24 MMOL/L
CORTIS SERPL-MCNC: 16.6 UG/DL
CREAT SERPL-MCNC: 0.68 MG/DL
DEPRECATED KAPPA LC FREE/LAMBDA SER: 0.82 RATIO
EGFR: 90 ML/MIN/1.73M2
ESTIMATED AVERAGE GLUCOSE: 126 MG/DL
GLUCOSE SERPL-MCNC: 133 MG/DL
HBA1C MFR BLD HPLC: 6 %
IGA SER QL IEP: 82 MG/DL
IGG SER QL IEP: 477 MG/DL
IGM SER QL IEP: 59 MG/DL
KAPPA LC CSF-MCNC: 1.33 MG/DL
KAPPA LC SERPL-MCNC: 1.09 MG/DL
LPL SERPL-CCNC: 60 U/L
POTASSIUM SERPL-SCNC: 4.4 MMOL/L
PROT SERPL-MCNC: 6.3 G/DL
SODIUM SERPL-SCNC: 143 MMOL/L

## 2024-03-30 NOTE — HISTORY OF PRESENT ILLNESS
[Disease:__________________________] : Disease: [unfilled] [de-identified] : Ms. 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  Splenomegaly was an incidental finding on a chest CT from 8/2020 which demonstrated bilateral groundglass infiltrates.  She is being followed by Dr. Anne for this.  Abdominal ultrasound from 11/5/2020 showed the spleen measuring 14.5 cm.  A 0.9 cm splenule was seen.  PET/CT showed the mild splenomegaly as well as diffuse low-level SUV positivity.  Pulmonary uptake was not described.  A tiny RUL lung nodule was seen which was too small to characterize.  Bone marrow aspirate and biopsy on 9/30/2020 showed a hypercellular marrow involved by a CD5 and CD10 negative low-grade B-cell lymphoma, which made up about 10% of marrow cells.  By flow, there was a small (2.5%) monotypic B-cell population expressing kappa, CD19, CD20, CD22, CD11c (dim), FMC 7, minimal CD25 and negative for CD5, CD10, CD23 and .  CLL FISH panel was negative, including TP53.  FISH for BIRC3/MALT1 was also negative. CBC, differential and platelets have been for the most part normal.  Beta-2 MG was 2.7 on 10/6/2020.  CMP at that time was normal.  LDH was normal.  Her major complaints are GI related: significant reflux sxs as well as early satiety. She has no constitutional complaints.  [FreeTextEntry1] : MZL Sandy 3/5/2021  [de-identified] : marginal zone Lymphoma - completed weekly Rituxan 3/2021  PET CT 6/2021 resolution of splenomegaly, no pulm findings Last CT chest 4/2023- stable pulm findings,stable bibasilar reticular opacities  no new pulmonary c/o; asthma managed with inhalers, PFTs stable, seeing Dr. Anne hypogajaqui  - recently had uncomplicated Covid, not given rx in [place] has SAJAN, uses CPAP, mod fatigue stable no constitutional c/o  3/26/24:  Patient is here today with her partner. She had a colonoscopy and endoscopy with Dr. Castrejon. She had 3 polyps. She reports that one polyp was abnormal and she is suspected to have Stevenson's esophagus, but pathology is pending. She reports she had been bulimic in the past.  She denies any fevers, chills, weight loss. She had lost some weight earlier due to being started on metformin by her endocrinologist for hyperglycemia . She denies any new adenopathy. Appetite has been stable. She sometimes has pain at the left side of the chest below the ribcage when exercising.  She had no infections in the last year. She had no hospitalizations in the last year.  She uses medical marijuana for sleep. She still is working as a therapist.

## 2024-03-30 NOTE — CONSULT LETTER
[DrJuan  ___] : Dr. ANDRES [FreeTextEntry3] : [facility] M.D., [facility] [DrJuan ___] : Dr. ANDRES

## 2024-03-30 NOTE — REVIEW OF SYSTEMS
[Shortness Of Breath] : shortness of breath [Cough] : cough [Negative] : Allergic/Immunologic [FreeTextEntry9] : no pain today [Joint Pain] : no joint pain [FreeTextEntry6] : occasional left chest wall pain below rib [de-identified] : occasional bruising

## 2024-03-30 NOTE — ASSESSMENT
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 S/P  rituximab 375 mg/sq m weekly x 4 > 2 yrs ago  7/2021- CT chest noncon- Left upper and lower lobe partial resection with associated post-surgical changes. MInimal peripheral reticulation and bronchiectasis is unchanged. No honeycombing.  9/2022 CT chest noncon- s/p wedge resection in left lung. stable minimal inter/intralobular septal thickening in the peripheral aspect of both lower lungs when compared to previous.  7/2022- PET/CT found resolution of splenomegaly with normalization of splenic uptake, interval HUNG post-surgical changes (compared to Oct 10, 2020) 4/2023 CT chest noncon- s/p HUNG wedge resection with stable post-op changes. Mild reticular opacities within lung bases are unchanged. No suspicious lung nodule  Patient denies any new symptoms, and does not have adenopathy or hepatosplenomegaly on exam. - Will check CT chest. Patient had resection of LLL which found extranodal MZL in Dec 2020. HUNG wedge resection found hemosiderin laden macrophages and pulmonary parenchyma.  - Will check US abdomen for interval evaluation of spleen (which had been involved on initial PET/CT in Oct 2020)  Labs: WBC 6.49, Hgb 14.3, MCV 96.8, RDW 12.9%, Plts 272k, CMP WNL  Kappa FLC 1.09, lambda FLC 1.33, K/L 0.82   #T2DM CMP overall is unremarkable. Mildly elevated serum glucose. A1C 6.0  #Chronic cough and ILD: Symptoms controlled on current asthma regimen.  ILD seems to have responded to rituximab  #Hypogammaglobulinemia: Patient has not had recent infections Significant incr in IgG from lowest level of 389 in 12/2020 IgG 477 low, IgA 82 low, IgM 59 WNL Will monitor for now. We will consider IGRT if the patient starts having frequent infections  AM cortisol 16.6 WNL  Lipase 60 WNL  Amylase 113 WNL   Health care maintenance: Advised patient to ensure regular mammography, pap smear and colonoscopy are up to date.  Patient requests results of labs sent to all her doctors Dr. Jake Gandhi -  Fax [phone].  Dr Childress- fax [phone] Dr Webster [phone] ,  Endocrinology [phone]   RV 4 mos seen and d/w Dr. Brett Kelly MD PGY6 hematology oncology fellow

## 2024-03-30 NOTE — END OF VISIT
[] : Fellow [FreeTextEntry3] : Doing well. No indication of recurrence. Plan CT chest and abd U/S, as above.

## 2024-04-09 ENCOUNTER — APPOINTMENT (OUTPATIENT)
Dept: CT IMAGING | Facility: CLINIC | Age: 79
End: 2024-04-09
Payer: MEDICARE

## 2024-04-09 ENCOUNTER — APPOINTMENT (OUTPATIENT)
Dept: ULTRASOUND IMAGING | Facility: CLINIC | Age: 79
End: 2024-04-09
Payer: MEDICARE

## 2024-04-09 ENCOUNTER — OUTPATIENT (OUTPATIENT)
Dept: OUTPATIENT SERVICES | Facility: HOSPITAL | Age: 79
LOS: 1 days | End: 2024-04-09
Payer: MEDICARE

## 2024-04-09 DIAGNOSIS — Z87.898 PERSONAL HISTORY OF OTHER SPECIFIED CONDITIONS: ICD-10-CM

## 2024-04-09 DIAGNOSIS — C88.4 EXTRANODAL MARGINAL ZONE B-CELL LYMPHOMA OF MUCOSA-ASSOCIATED LYMPHOID TISSUE [MALT-LYMPHOMA]: ICD-10-CM

## 2024-04-09 PROCEDURE — 76705 ECHO EXAM OF ABDOMEN: CPT | Mod: 26

## 2024-04-09 PROCEDURE — 76705 ECHO EXAM OF ABDOMEN: CPT

## 2024-04-09 PROCEDURE — 71250 CT THORAX DX C-: CPT

## 2024-04-09 PROCEDURE — 71250 CT THORAX DX C-: CPT | Mod: 26,MH

## 2024-06-19 ENCOUNTER — APPOINTMENT (OUTPATIENT)
Dept: RHEUMATOLOGY | Facility: CLINIC | Age: 79
End: 2024-06-19
Payer: MEDICARE

## 2024-06-19 VITALS
TEMPERATURE: 98 F | SYSTOLIC BLOOD PRESSURE: 112 MMHG | RESPIRATION RATE: 17 BRPM | DIASTOLIC BLOOD PRESSURE: 59 MMHG | HEART RATE: 59 BPM | OXYGEN SATURATION: 98 %

## 2024-06-19 VITALS
RESPIRATION RATE: 17 BRPM | SYSTOLIC BLOOD PRESSURE: 126 MMHG | DIASTOLIC BLOOD PRESSURE: 68 MMHG | TEMPERATURE: 98 F | HEART RATE: 60 BPM | OXYGEN SATURATION: 98 %

## 2024-06-19 PROCEDURE — 96374 THER/PROPH/DIAG INJ IV PUSH: CPT | Mod: 59

## 2024-06-19 RX ORDER — ZOLEDRONIC ACID 5 MG/100ML
5 INJECTION INTRAVENOUS
Qty: 0 | Refills: 0 | Status: COMPLETED | OUTPATIENT
Start: 2024-06-17

## 2024-06-19 NOTE — HISTORY OF PRESENT ILLNESS
[N/A] : N/A [Denies] : Denies [No] : No [Yes] : Yes [Declined] : Declined [Vitamin D] : Vitamin D [Bone Density/DEXA] : Bone Density/DEXA [Left upper extremity] : Left upper extremity [24g] : 24g [Start Time: ___] : Medication Start Time: [unfilled] [End Time: ___] : Medication End Time: [unfilled] [Medication Name: ___] : Medication Name: [unfilled] [Total Amount Administered: ___] : Total Amount Administered: [unfilled] [IV discontinued. Intact. No signs or symptoms of IV complications noted. Time: ___] : IV discontinued. Intact. No signs or symptoms of IV complications noted. Time: [unfilled] [Patient  instructed to seek medical attention with signs and symptoms of adverse effects] : Patient  instructed to seek medical attention with signs and symptoms of adverse effects [Patient left unit in no acute distress] : Patient left unit in no acute distress [Medications administered as ordered and tolerated well.] : Medications administered as ordered and tolerated well. [de-identified] : 3:15 pm [de-identified] : denies any invasive dental work done.

## 2024-07-18 ENCOUNTER — OUTPATIENT (OUTPATIENT)
Dept: OUTPATIENT SERVICES | Facility: HOSPITAL | Age: 79
LOS: 1 days | Discharge: HOME | End: 2024-07-18

## 2024-07-18 DIAGNOSIS — C85.80 OTHER SPECIFIED TYPES OF NON-HODGKIN LYMPHOMA, UNSPECIFIED SITE: ICD-10-CM

## 2024-07-23 ENCOUNTER — RESULT REVIEW (OUTPATIENT)
Age: 79
End: 2024-07-23

## 2024-07-23 ENCOUNTER — APPOINTMENT (OUTPATIENT)
Dept: HEMATOLOGY ONCOLOGY | Facility: CLINIC | Age: 79
End: 2024-07-23
Payer: MEDICARE

## 2024-07-23 VITALS
RESPIRATION RATE: 16 BRPM | DIASTOLIC BLOOD PRESSURE: 69 MMHG | OXYGEN SATURATION: 99 % | WEIGHT: 117.51 LBS | TEMPERATURE: 97.2 F | BODY MASS INDEX: 22.95 KG/M2 | SYSTOLIC BLOOD PRESSURE: 114 MMHG | HEART RATE: 59 BPM

## 2024-07-23 DIAGNOSIS — J84.9 INTERSTITIAL PULMONARY DISEASE, UNSPECIFIED: ICD-10-CM

## 2024-07-23 DIAGNOSIS — Z92.22 PERSONAL HISTORY OF MONOCLONAL DRUG THERAPY: ICD-10-CM

## 2024-07-23 DIAGNOSIS — Z92.89 PERSONAL HISTORY OF OTHER MEDICAL TREATMENT: ICD-10-CM

## 2024-07-23 DIAGNOSIS — C88.4 EXTRANODAL MARGINAL ZONE B-CELL LYMPHOMA OF MUCOSA-ASSOCIATED LYMPHOID TISSUE [MALT-LYMPHOMA]: ICD-10-CM

## 2024-07-23 DIAGNOSIS — Z87.898 PERSONAL HISTORY OF OTHER SPECIFIED CONDITIONS: ICD-10-CM

## 2024-07-23 DIAGNOSIS — D80.1 NONFAMILIAL HYPOGAMMAGLOBULINEMIA: ICD-10-CM

## 2024-07-23 DIAGNOSIS — G47.33 OBSTRUCTIVE SLEEP APNEA (ADULT) (PEDIATRIC): ICD-10-CM

## 2024-07-23 LAB
ALBUMIN SERPL ELPH-MCNC: 4.5 G/DL
ALP BLD-CCNC: 57 U/L
ALT SERPL-CCNC: 24 U/L
ANION GAP SERPL CALC-SCNC: 14 MMOL/L
AST SERPL-CCNC: 21 U/L
BASOPHILS # BLD AUTO: 0.03 K/UL — SIGNIFICANT CHANGE UP (ref 0–0.2)
BASOPHILS NFR BLD AUTO: 0.4 % — SIGNIFICANT CHANGE UP (ref 0–2)
BILIRUB SERPL-MCNC: 0.3 MG/DL
BUN SERPL-MCNC: 31 MG/DL
CALCIUM SERPL-MCNC: 9.1 MG/DL
CHLORIDE SERPL-SCNC: 105 MMOL/L
CO2 SERPL-SCNC: 24 MMOL/L
CREAT SERPL-MCNC: 0.68 MG/DL
DEPRECATED KAPPA LC FREE/LAMBDA SER: 0.86 RATIO
EGFR: 89 ML/MIN/1.73M2
EOSINOPHIL # BLD AUTO: 0.13 K/UL — SIGNIFICANT CHANGE UP (ref 0–0.5)
EOSINOPHIL NFR BLD AUTO: 1.9 % — SIGNIFICANT CHANGE UP (ref 0–6)
GLUCOSE SERPL-MCNC: 136 MG/DL
HCT VFR BLD CALC: 40.6 % — SIGNIFICANT CHANGE UP (ref 34.5–45)
HGB BLD-MCNC: 13.4 G/DL — SIGNIFICANT CHANGE UP (ref 11.5–15.5)
IGA SER QL IEP: 78 MG/DL
IGG SER QL IEP: 489 MG/DL
IGM SER QL IEP: 63 MG/DL
IMM GRANULOCYTES NFR BLD AUTO: 0.4 % — SIGNIFICANT CHANGE UP (ref 0–0.9)
KAPPA LC CSF-MCNC: 1.27 MG/DL
KAPPA LC SERPL-MCNC: 1.09 MG/DL
LDH SERPL-CCNC: 191 U/L
LYMPHOCYTES # BLD AUTO: 1.27 K/UL — SIGNIFICANT CHANGE UP (ref 1–3.3)
LYMPHOCYTES # BLD AUTO: 18.1 % — SIGNIFICANT CHANGE UP (ref 13–44)
MCHC RBC-ENTMCNC: 32.4 PG — SIGNIFICANT CHANGE UP (ref 27–34)
MCHC RBC-ENTMCNC: 33 G/DL — SIGNIFICANT CHANGE UP (ref 32–36)
MCV RBC AUTO: 98.1 FL — SIGNIFICANT CHANGE UP (ref 80–100)
MONOCYTES # BLD AUTO: 0.58 K/UL — SIGNIFICANT CHANGE UP (ref 0–0.9)
MONOCYTES NFR BLD AUTO: 8.3 % — SIGNIFICANT CHANGE UP (ref 2–14)
NEUTROPHILS # BLD AUTO: 4.96 K/UL — SIGNIFICANT CHANGE UP (ref 1.8–7.4)
NEUTROPHILS NFR BLD AUTO: 70.9 % — SIGNIFICANT CHANGE UP (ref 43–77)
NRBC # BLD: 0 /100 WBCS — SIGNIFICANT CHANGE UP (ref 0–0)
PLATELET # BLD AUTO: 276 K/UL — SIGNIFICANT CHANGE UP (ref 150–400)
POTASSIUM SERPL-SCNC: 4.6 MMOL/L
PROT SERPL-MCNC: 6.4 G/DL
RBC # BLD: 4.14 M/UL — SIGNIFICANT CHANGE UP (ref 3.8–5.2)
RBC # FLD: 13 % — SIGNIFICANT CHANGE UP (ref 10.3–14.5)
SODIUM SERPL-SCNC: 143 MMOL/L
WBC # BLD: 7 K/UL — SIGNIFICANT CHANGE UP (ref 3.8–10.5)
WBC # FLD AUTO: 7 K/UL — SIGNIFICANT CHANGE UP (ref 3.8–10.5)

## 2024-07-23 PROCEDURE — G2211 COMPLEX E/M VISIT ADD ON: CPT

## 2024-07-23 PROCEDURE — 99213 OFFICE O/P EST LOW 20 MIN: CPT

## 2024-07-23 NOTE — REVIEW OF SYSTEMS
[Negative] : Allergic/Immunologic [de-identified] : occasional bruising [Insomnia] : insomnia [Joint Pain] : no joint pain [FreeTextEntry6] : SAJAN uses CPAP at night [de-identified] : insomnia helped with medical marijuana

## 2024-07-23 NOTE — REVIEW OF SYSTEMS
[Negative] : Allergic/Immunologic [de-identified] : occasional bruising [Insomnia] : insomnia [Joint Pain] : no joint pain [FreeTextEntry6] : SAJAN uses CPAP at night [de-identified] : insomnia helped with medical marijuana

## 2024-07-23 NOTE — HISTORY OF PRESENT ILLNESS
[Disease:__________________________] : Disease: [unfilled] [de-identified] : Ms. 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  Splenomegaly was an incidental finding on a chest CT from 8/2020 which demonstrated bilateral groundglass infiltrates.  She is being followed by Dr. Anne for this.  Abdominal ultrasound from 11/5/2020 showed the spleen measuring 14.5 cm.  A 0.9 cm splenule was seen.  PET/CT showed the mild splenomegaly as well as diffuse low-level SUV positivity.  Pulmonary uptake was not described.  A tiny RUL lung nodule was seen which was too small to characterize.  Bone marrow aspirate and biopsy on 9/30/2020 showed a hypercellular marrow involved by a CD5 and CD10 negative low-grade B-cell lymphoma, which made up about 10% of marrow cells.  By flow, there was a small (2.5%) monotypic B-cell population expressing kappa, CD19, CD20, CD22, CD11c (dim), FMC 7, minimal CD25 and negative for CD5, CD10, CD23 and .  CLL FISH panel was negative, including TP53.  FISH for BIRC3/MALT1 was also negative. CBC, differential and platelets have been for the most part normal.  Beta-2 MG was 2.7 on 10/6/2020.  CMP at that time was normal.  LDH was normal.  Her major complaints are GI related: significant reflux sxs as well as early satiety. She has no constitutional complaints.  [FreeTextEntry1] : MZL Sandy 3/5/2021  [de-identified] : marginal zone Lymphoma - completed weekly Rituxan 3/2021  PET CT 6/2021 resolution of splenomegaly, no pulm findings CT chest 4/2023- stable pulm findings,stable bibasilar reticular opacities  CT chest 4/2024:  Left upper lobe and left lower lobe wedge resections with stable postoperative change. No discrete lung nodule or mass. No thoracic lymphadenopathy. no new pulmonary c/o; asthma managed with symbicort, doesn't need rescues, seeing Dr. Anne hypogamma  - recently had uncomplicated Covid, not given rx in [place] has SAJAN, uses CPAP regularly, mod fatigue stable no constitutional c/o

## 2024-07-23 NOTE — CONSULT LETTER
[DrJuan  ___] : Dr. ANDRES [FreeTextEntry3] : [facility]., [facility] [DrJuan ___] : Dr. ANDRES

## 2024-07-23 NOTE — CONSULT LETTER
[DrJuan  ___] : Dr. ANDRES [FreeTextEntry3] : [facility] M.D., [facility] [DrJuan ___] : Dr. ANDRES

## 2024-07-23 NOTE — HISTORY OF PRESENT ILLNESS
[Disease:__________________________] : Disease: [unfilled] [de-identified] : Ms. 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  Splenomegaly was an incidental finding on a chest CT from 8/2020 which demonstrated bilateral groundglass infiltrates.  She is being followed by Dr. Anne for this.  Abdominal ultrasound from 11/5/2020 showed the spleen measuring 14.5 cm.  A 0.9 cm splenule was seen.  PET/CT showed the mild splenomegaly as well as diffuse low-level SUV positivity.  Pulmonary uptake was not described.  A tiny RUL lung nodule was seen which was too small to characterize.  Bone marrow aspirate and biopsy on 9/30/2020 showed a hypercellular marrow involved by a CD5 and CD10 negative low-grade B-cell lymphoma, which made up about 10% of marrow cells.  By flow, there was a small (2.5%) monotypic B-cell population expressing kappa, CD19, CD20, CD22, CD11c (dim), FMC 7, minimal CD25 and negative for CD5, CD10, CD23 and .  CLL FISH panel was negative, including TP53.  FISH for BIRC3/MALT1 was also negative. CBC, differential and platelets have been for the most part normal.  Beta-2 MG was 2.7 on 10/6/2020.  CMP at that time was normal.  LDH was normal.  Her major complaints are GI related: significant reflux sxs as well as early satiety. She has no constitutional complaints.  [FreeTextEntry1] : MZL Sandy 3/5/2021  [de-identified] : marginal zone Lymphoma - completed weekly Rituxan 3/2021  PET CT 6/2021 resolution of splenomegaly, no pulm findings CT chest 4/2023- stable pulm findings,stable bibasilar reticular opacities  CT chest 4/2024:  Left upper lobe and left lower lobe wedge resections with stable postoperative change. No discrete lung nodule or mass. No thoracic lymphadenopathy. no new pulmonary c/o; asthma managed with symbicort, doesn't need rescues, seeing Dr. Anne hypogamma  - recently had uncomplicated Covid, not given rx in [place] has SAJAN, uses CPAP regularly, mod fatigue stable no constitutional c/o

## 2024-07-23 NOTE — ASSESSMENT
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 S/P  rituximab 375 mg/sq m weekly x 4 > 3 yrs ago  7/2021- CT chest - Left upper and lower lobe partial resection with associated post-surgical changes. MInimal peripheral reticulation and bronchiectasis is unchanged. No honeycombing.  9/2022 CT chest noncon- s/p wedge resection in left lung. stable minimal inter/intralobular septal thickening in the peripheral aspect of both lower lungs when compared to previous.  7/2022- PET/CT found resolution of splenomegaly with normalization of splenic uptake, interval HUNG post-surgical changes (compared to Oct 10, 2020) 4/2023 CT chest noncon- s/p HUNG wedge resection with stable post-op changes. Mild reticular opacities within lung bases are unchanged. No suspicious lung nodule 4/2024 CT chest no new findings abd sono 4/2024: no s[plenomegaly  asymptomatic, no significant pulm c/o, no cough     CBC results reviewed and d/w pt   WBC 7.00, Hgb 13.4, Plts 276k, nl diff    #Chronic cough and ILD:  overall much improved, uses less inhalers, no cough  #Hypogammaglobulinemia: Patient has not had frequent infections  no need for imaging at this time check CMP, LDH, immunoglobulins   Patient requests results of labs sent to all her doctors Dr. Jake Gandhi -  Fax [phone].  Dr Childress- fax [phone] Dr Webster [phone] ,  Endocrinology [phone]  RV 4 mos

## 2024-07-23 NOTE — END OF VISIT
[] : Fellow [FreeTextEntry3] : Doing well. No indication of recurrence. Plan CT chest and abd U/S, as above. [Time Spent: ___ minutes] : I have spent [unfilled] minutes of time on the encounter.

## 2024-07-23 NOTE — ASSESSMENT
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 S/P  rituximab 375 mg/sq m weekly x 4 > 3 yrs ago  7/2021- CT chest - Left upper and lower lobe partial resection with associated post-surgical changes. MInimal peripheral reticulation and bronchiectasis is unchanged. No honeycombing.  9/2022 CT chest noncon- s/p wedge resection in left lung. stable minimal inter/intralobular septal thickening in the peripheral aspect of both lower lungs when compared to previous.  7/2022- PET/CT found resolution of splenomegaly with normalization of splenic uptake, interval HUNG post-surgical changes (compared to Oct 10, 2020) 4/2023 CT chest noncon- s/p HUNG wedge resection with stable post-op changes. Mild reticular opacities within lung bases are unchanged. No suspicious lung nodule 4/2024 CT chest no new findings abd sono 4/2024: no s[plenomegaly  asymptomatic, no significant pulm c/o, no cough     CBC results reviewed and d/w pt   WBC 7.00, Hgb 13.4, Plts 276k, nl diff    #Chronic cough and ILD:  overall much improved, uses less inhalers, no cough  #Hypogammaglobulinemia: Patient has not had frequent infections  no need for imaging at this time check CMP, LDH, immunoglobulins   Patient requests results of labs sent to all her doctors Dr. Jake Gandhi -  Fax [phone].  Dr Childress- fax [phone] Dr Webster [phone] ,  Endocrinology [phone]  RV 4 mos

## 2024-09-13 ENCOUNTER — APPOINTMENT (OUTPATIENT)
Dept: PALLIATIVE MEDICINE | Facility: CLINIC | Age: 79
End: 2024-09-13

## 2024-09-13 DIAGNOSIS — G47.00 INSOMNIA, UNSPECIFIED: ICD-10-CM

## 2024-09-13 DIAGNOSIS — F41.9 ANXIETY DISORDER, UNSPECIFIED: ICD-10-CM

## 2024-09-13 DIAGNOSIS — Z51.5 ENCOUNTER FOR PALLIATIVE CARE: ICD-10-CM

## 2024-09-13 DIAGNOSIS — C88.4 EXTRANODAL MARGINAL ZONE B-CELL LYMPHOMA OF MUCOSA-ASSOCIATED LYMPHOID TISSUE [MALT-LYMPHOMA]: ICD-10-CM

## 2024-09-13 PROCEDURE — 99214 OFFICE O/P EST MOD 30 MIN: CPT

## 2024-09-13 NOTE — HISTORY OF PRESENT ILLNESS
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 Splenomegaly was an incidental finding on a chest CT from 8/2020 which demonstrated bilateral groundglass infiltrates. She is being followed by Dr. Anne for this. Abdominal ultrasound from 11/5/2020 showed the spleen measuring 14.5 cm. A 0.9 cm splenule was seen. PET/CT showed the mild splenomegaly as well as diffuse low-level SUV positivity. Pulmonary uptake was not described. A tiny RUL lung nodule was seen which was too small to characterize.  Bone marrow aspirate and biopsy on 9/30/2020 showed a hypercellular marrow involved by a CD5 and CD10 negative low-grade B-cell lymphoma, which made up about 10% of marrow cells.  Providers: Oncologist - Dr. West  Treatment: completed weekly Rituxan 3/2021  Interval Hx: Last seen in supportive oncology-palliative care clinic 12 months ago. Takes cannabis gummies 20:1 5mg THC. Under a lot of stress due to her illness and being primary caregiver for her partner with dementia.  SYMPTOMS: #Anxiety Lots of anxiety surrounding her medical conditions and stress of caring for her partner who had advanced dementia. Has a psychiatrist and therapist and is on Lexapro 10mg which feels has helped calm her down though she still has major anxiety. Uses cannabis gummies 20:1 at 5mg THC which help and she has no adverse effects from them.  #Insomnia Having difficulty with sleep which has been a chronic issue. Recently sleeping 4 hours at night and then will sleep occasionally 7 hours after having a few days of only sleeping 4 hours. Psychiatrist recently prescribed quetiapine which she used last night and found she was able to sleep 7-8 hours. Tried cannabis for sleep and was helping a little but not significantly.  #Pain Generalized joint pain (shoulders, hips, feet), intermittent. Manageable with OTC medications. ISTOP Ref #: [id number]  ROS: If [ ] blank, symptom not present Fatigue [ ] Nausea [ ] Loss of appetite [ ] Unintentional weight loss [ ] Constipation [ ] Diarrhea [ ] Anxiety [X] Low mood [ ] Other symptoms: [x ] All other review of symptoms negative  SH: Partner of 25 years has dementia she cares for him. 2 adult daughters. Still works part time as a therapist.  Advanced Directives: HCP - Cindy (dtr), Frances Campos (dtr) MOLST - None

## 2024-09-13 NOTE — ASSESSMENT
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  #Anxiety - Medical cannabis certification completed previously, expiration date extended by 1 year. - C/w Lexapro, managed by psychiatrist - C/w psychiatry and therapy follow-up - Cannabis gummies 20:1 5mg THC per dose 2-3 times daily PRN  #Insomnia Likely closely related to patient's anxiety - On quetiapine managed by psychiatrist  #ACP - HCP: daughters Cindy and Frances  F/u as needed

## 2024-09-13 NOTE — REASON FOR VISIT
[Home] : at home, [unfilled] , at the time of the visit. [Medical Office: ([facility])___] : at the medical office located in  [Patient] : the patient

## 2024-09-13 NOTE — PHYSICAL EXAM
[General Appearance - Alert] : alert [General Appearance - In No Acute Distress] : in no acute distress [General Appearance - Well Nourished] : well nourished [Sclera] : the sclera and conjunctiva were normal [No Oral Pallor] : no oral pallor [Outer Ear] : the ears and nose were normal in appearance [Hearing Threshold Finger Rub Not Story] : hearing was normal [Neck Appearance] : the appearance of the neck was normal [] : no respiratory distress [Respiration, Rhythm And Depth] : normal respiratory rhythm and effort [Skin Color & Pigmentation] : normal skin color and pigmentation [Oriented To Time, Place, And Person] : oriented to person, place, and time [Impaired Insight] : insight and judgment were intact [Affect] : the affect was normal [FreeTextEntry1] : Anxious mood

## 2024-10-01 ENCOUNTER — APPOINTMENT (OUTPATIENT)
Dept: OBGYN | Facility: CLINIC | Age: 79
End: 2024-10-01
Payer: MEDICARE

## 2024-10-01 VITALS
BODY MASS INDEX: 22.38 KG/M2 | DIASTOLIC BLOOD PRESSURE: 50 MMHG | WEIGHT: 114 LBS | SYSTOLIC BLOOD PRESSURE: 122 MMHG | HEIGHT: 60 IN

## 2024-10-01 DIAGNOSIS — Z86.19 PERSONAL HISTORY OF OTHER INFECTIOUS AND PARASITIC DISEASES: ICD-10-CM

## 2024-10-01 DIAGNOSIS — Z01.419 ENCOUNTER FOR GYNECOLOGICAL EXAMINATION (GENERAL) (ROUTINE) W/OUT ABNORMAL FINDINGS: ICD-10-CM

## 2024-10-01 PROCEDURE — G0101: CPT

## 2024-10-01 NOTE — PHYSICAL EXAM
[Chaperone Declined] : Patient declined chaperone [Appropriately responsive] : appropriately responsive [Alert] : alert [No Acute Distress] : no acute distress [No Lymphadenopathy] : no lymphadenopathy [Soft] : soft [Non-tender] : non-tender [Non-distended] : non-distended [No HSM] : No HSM [No Lesions] : no lesions [No Mass] : no mass [Oriented x3] : oriented x3 [Examination Of The Breasts] : a normal appearance [No Masses] : no breast masses were palpable [Labia Majora] : normal [Labia Minora] : normal [Normal] : normal [Absent] : absent [Uterine Adnexae] : normal [FreeTextEntry6] : dense [Tenderness] : nontender

## 2024-10-01 NOTE — HISTORY OF PRESENT ILLNESS
[FreeTextEntry1] : 80yo PM woman here for annual exam  no gyn complaints  headache today frontal sinus, recent flight [Mammogramdate] : 2024 [PapSmeardate] : 6/2024

## 2024-10-08 LAB — CYTOLOGY CVX/VAG DOC THIN PREP: ABNORMAL

## 2024-11-01 ENCOUNTER — OUTPATIENT (OUTPATIENT)
Dept: OUTPATIENT SERVICES | Facility: HOSPITAL | Age: 79
LOS: 1 days | Discharge: HOME | End: 2024-11-01

## 2024-11-01 DIAGNOSIS — C85.80 OTHER SPECIFIED TYPES OF NON-HODGKIN LYMPHOMA, UNSPECIFIED SITE: ICD-10-CM

## 2024-11-05 ENCOUNTER — RESULT REVIEW (OUTPATIENT)
Age: 79
End: 2024-11-05

## 2024-11-05 ENCOUNTER — APPOINTMENT (OUTPATIENT)
Dept: HEMATOLOGY ONCOLOGY | Facility: CLINIC | Age: 79
End: 2024-11-05
Payer: MEDICARE

## 2024-11-05 ENCOUNTER — NON-APPOINTMENT (OUTPATIENT)
Age: 79
End: 2024-11-05

## 2024-11-05 VITALS
HEART RATE: 58 BPM | OXYGEN SATURATION: 97 % | TEMPERATURE: 97.3 F | BODY MASS INDEX: 22.82 KG/M2 | SYSTOLIC BLOOD PRESSURE: 126 MMHG | RESPIRATION RATE: 16 BRPM | DIASTOLIC BLOOD PRESSURE: 66 MMHG | WEIGHT: 116.82 LBS

## 2024-11-05 DIAGNOSIS — Z92.22 PERSONAL HISTORY OF MONOCLONAL DRUG THERAPY: ICD-10-CM

## 2024-11-05 DIAGNOSIS — Z87.898 PERSONAL HISTORY OF OTHER SPECIFIED CONDITIONS: ICD-10-CM

## 2024-11-05 DIAGNOSIS — C88.40 EXTRNOD MRGNL B-CELL LYMPH MUCOSA-ASSOC LYM TISS NOT REMIS: ICD-10-CM

## 2024-11-05 DIAGNOSIS — J84.9 INTERSTITIAL PULMONARY DISEASE, UNSPECIFIED: ICD-10-CM

## 2024-11-05 DIAGNOSIS — D80.1 NONFAMILIAL HYPOGAMMAGLOBULINEMIA: ICD-10-CM

## 2024-11-05 DIAGNOSIS — Z92.89 PERSONAL HISTORY OF OTHER MEDICAL TREATMENT: ICD-10-CM

## 2024-11-05 LAB
BASOPHILS # BLD AUTO: 0.03 K/UL — SIGNIFICANT CHANGE UP (ref 0–0.2)
BASOPHILS NFR BLD AUTO: 0.5 % — SIGNIFICANT CHANGE UP (ref 0–2)
EOSINOPHIL # BLD AUTO: 0.07 K/UL — SIGNIFICANT CHANGE UP (ref 0–0.5)
EOSINOPHIL NFR BLD AUTO: 1.2 % — SIGNIFICANT CHANGE UP (ref 0–6)
HCT VFR BLD CALC: 39.4 % — SIGNIFICANT CHANGE UP (ref 34.5–45)
HGB BLD-MCNC: 13.1 G/DL — SIGNIFICANT CHANGE UP (ref 11.5–15.5)
IMM GRANULOCYTES NFR BLD AUTO: 0.3 % — SIGNIFICANT CHANGE UP (ref 0–0.9)
LYMPHOCYTES # BLD AUTO: 0.99 K/UL — LOW (ref 1–3.3)
LYMPHOCYTES # BLD AUTO: 16.6 % — SIGNIFICANT CHANGE UP (ref 13–44)
MCHC RBC-ENTMCNC: 32 PG — SIGNIFICANT CHANGE UP (ref 27–34)
MCHC RBC-ENTMCNC: 33.2 G/DL — SIGNIFICANT CHANGE UP (ref 32–36)
MCV RBC AUTO: 96.3 FL — SIGNIFICANT CHANGE UP (ref 80–100)
MONOCYTES # BLD AUTO: 0.55 K/UL — SIGNIFICANT CHANGE UP (ref 0–0.9)
MONOCYTES NFR BLD AUTO: 9.2 % — SIGNIFICANT CHANGE UP (ref 2–14)
NEUTROPHILS # BLD AUTO: 4.31 K/UL — SIGNIFICANT CHANGE UP (ref 1.8–7.4)
NEUTROPHILS NFR BLD AUTO: 72.2 % — SIGNIFICANT CHANGE UP (ref 43–77)
NRBC # BLD: 0 /100 WBCS — SIGNIFICANT CHANGE UP (ref 0–0)
NRBC BLD-RTO: 0 /100 WBCS — SIGNIFICANT CHANGE UP (ref 0–0)
PLATELET # BLD AUTO: 259 K/UL — SIGNIFICANT CHANGE UP (ref 150–400)
RBC # BLD: 4.09 M/UL — SIGNIFICANT CHANGE UP (ref 3.8–5.2)
RBC # FLD: 13 % — SIGNIFICANT CHANGE UP (ref 10.3–14.5)
WBC # BLD: 5.97 K/UL — SIGNIFICANT CHANGE UP (ref 3.8–10.5)
WBC # FLD AUTO: 5.97 K/UL — SIGNIFICANT CHANGE UP (ref 3.8–10.5)

## 2024-11-05 PROCEDURE — 99213 OFFICE O/P EST LOW 20 MIN: CPT

## 2024-11-05 PROCEDURE — G2211 COMPLEX E/M VISIT ADD ON: CPT

## 2024-11-07 PROBLEM — C88.40 EXTRANODAL MARGINAL ZONE B-CELL LYMPHOMA: Status: ACTIVE | Noted: 2021-02-11

## 2024-12-10 ENCOUNTER — APPOINTMENT (OUTPATIENT)
Dept: ORTHOPEDIC SURGERY | Facility: CLINIC | Age: 79
End: 2024-12-10
Payer: MEDICARE

## 2024-12-10 VITALS
SYSTOLIC BLOOD PRESSURE: 126 MMHG | WEIGHT: 116 LBS | DIASTOLIC BLOOD PRESSURE: 66 MMHG | BODY MASS INDEX: 15.71 KG/M2 | HEIGHT: 72 IN | HEART RATE: 58 BPM

## 2024-12-10 DIAGNOSIS — Z78.9 OTHER SPECIFIED HEALTH STATUS: ICD-10-CM

## 2024-12-10 DIAGNOSIS — M18.12 UNILATERAL PRIMARY OSTEOARTHRITIS OF FIRST CARPOMETACARPAL JOINT, LEFT HAND: ICD-10-CM

## 2024-12-10 DIAGNOSIS — M19.032 PRIMARY OSTEOARTHRITIS, LEFT WRIST: ICD-10-CM

## 2024-12-10 DIAGNOSIS — M19.031 PRIMARY OSTEOARTHRITIS, RIGHT WRIST: ICD-10-CM

## 2024-12-10 DIAGNOSIS — M18.11 UNILATERAL PRIMARY OSTEOARTHRITIS OF FIRST CARPOMETACARPAL JOINT, RIGHT HAND: ICD-10-CM

## 2024-12-10 DIAGNOSIS — M25.532 PAIN IN LEFT WRIST: ICD-10-CM

## 2024-12-10 PROCEDURE — 99203 OFFICE O/P NEW LOW 30 MIN: CPT

## 2024-12-10 PROCEDURE — 73110 X-RAY EXAM OF WRIST: CPT | Mod: 50

## 2024-12-10 RX ORDER — DICLOFENAC SODIUM 10 MG/G
1 GEL TOPICAL
Qty: 1 | Refills: 3 | Status: ACTIVE | COMMUNITY
Start: 2024-12-10 | End: 1900-01-01

## 2025-02-12 DIAGNOSIS — C88.40 EXTRNOD MRGNL B-CELL LYMPH MUCOSA-ASSOC LYM TISS NOT REMIS: ICD-10-CM

## 2025-03-07 ENCOUNTER — OUTPATIENT (OUTPATIENT)
Dept: OUTPATIENT SERVICES | Facility: HOSPITAL | Age: 80
LOS: 1 days | Discharge: HOME | End: 2025-03-07

## 2025-03-07 DIAGNOSIS — C85.80 OTHER SPECIFIED TYPES OF NON-HODGKIN LYMPHOMA, UNSPECIFIED SITE: ICD-10-CM

## 2025-03-11 ENCOUNTER — RESULT REVIEW (OUTPATIENT)
Age: 80
End: 2025-03-11

## 2025-03-11 ENCOUNTER — NON-APPOINTMENT (OUTPATIENT)
Age: 80
End: 2025-03-11

## 2025-03-11 ENCOUNTER — APPOINTMENT (OUTPATIENT)
Dept: HEMATOLOGY ONCOLOGY | Facility: CLINIC | Age: 80
End: 2025-03-11
Payer: MEDICARE

## 2025-03-11 VITALS
TEMPERATURE: 97 F | DIASTOLIC BLOOD PRESSURE: 73 MMHG | HEART RATE: 68 BPM | OXYGEN SATURATION: 98 % | BODY MASS INDEX: 15.64 KG/M2 | WEIGHT: 115.3 LBS | SYSTOLIC BLOOD PRESSURE: 122 MMHG | RESPIRATION RATE: 16 BRPM

## 2025-03-11 DIAGNOSIS — D80.1 NONFAMILIAL HYPOGAMMAGLOBULINEMIA: ICD-10-CM

## 2025-03-11 DIAGNOSIS — R05.3 CHRONIC COUGH: ICD-10-CM

## 2025-03-11 DIAGNOSIS — J84.9 INTERSTITIAL PULMONARY DISEASE, UNSPECIFIED: ICD-10-CM

## 2025-03-11 DIAGNOSIS — Z92.89 PERSONAL HISTORY OF OTHER MEDICAL TREATMENT: ICD-10-CM

## 2025-03-11 DIAGNOSIS — Z87.898 PERSONAL HISTORY OF OTHER SPECIFIED CONDITIONS: ICD-10-CM

## 2025-03-11 DIAGNOSIS — Z92.22 PERSONAL HISTORY OF MONOCLONAL DRUG THERAPY: ICD-10-CM

## 2025-03-11 DIAGNOSIS — C88.40 EXTRNOD MRGNL B-CELL LYMPH MUCOSA-ASSOC LYM TISS NOT REMIS: ICD-10-CM

## 2025-03-11 LAB
BASOPHILS # BLD AUTO: 0.04 K/UL — SIGNIFICANT CHANGE UP (ref 0–0.2)
BASOPHILS NFR BLD AUTO: 0.6 % — SIGNIFICANT CHANGE UP (ref 0–2)
EOSINOPHIL # BLD AUTO: 0.09 K/UL — SIGNIFICANT CHANGE UP (ref 0–0.5)
EOSINOPHIL NFR BLD AUTO: 1.4 % — SIGNIFICANT CHANGE UP (ref 0–6)
HCT VFR BLD CALC: 40 % — SIGNIFICANT CHANGE UP (ref 34.5–45)
HGB BLD-MCNC: 13.5 G/DL — SIGNIFICANT CHANGE UP (ref 11.5–15.5)
IMM GRANULOCYTES NFR BLD AUTO: 0.6 % — SIGNIFICANT CHANGE UP (ref 0–0.9)
LYMPHOCYTES # BLD AUTO: 1.43 K/UL — SIGNIFICANT CHANGE UP (ref 1–3.3)
LYMPHOCYTES # BLD AUTO: 22 % — SIGNIFICANT CHANGE UP (ref 13–44)
MCHC RBC-ENTMCNC: 32.1 PG — SIGNIFICANT CHANGE UP (ref 27–34)
MCHC RBC-ENTMCNC: 33.8 G/DL — SIGNIFICANT CHANGE UP (ref 32–36)
MCV RBC AUTO: 95 FL — SIGNIFICANT CHANGE UP (ref 80–100)
MONOCYTES # BLD AUTO: 0.64 K/UL — SIGNIFICANT CHANGE UP (ref 0–0.9)
MONOCYTES NFR BLD AUTO: 9.8 % — SIGNIFICANT CHANGE UP (ref 2–14)
NEUTROPHILS # BLD AUTO: 4.27 K/UL — SIGNIFICANT CHANGE UP (ref 1.8–7.4)
NEUTROPHILS NFR BLD AUTO: 65.6 % — SIGNIFICANT CHANGE UP (ref 43–77)
NRBC BLD AUTO-RTO: 0 /100 WBCS — SIGNIFICANT CHANGE UP (ref 0–0)
PLATELET # BLD AUTO: 324 K/UL — SIGNIFICANT CHANGE UP (ref 150–400)
RBC # BLD: 4.21 M/UL — SIGNIFICANT CHANGE UP (ref 3.8–5.2)
RBC # FLD: 12.9 % — SIGNIFICANT CHANGE UP (ref 10.3–14.5)
WBC # BLD: 6.51 K/UL — SIGNIFICANT CHANGE UP (ref 3.8–10.5)
WBC # FLD AUTO: 6.51 K/UL — SIGNIFICANT CHANGE UP (ref 3.8–10.5)

## 2025-03-11 PROCEDURE — G2211 COMPLEX E/M VISIT ADD ON: CPT

## 2025-03-11 PROCEDURE — 99214 OFFICE O/P EST MOD 30 MIN: CPT

## 2025-03-12 LAB
CHOLEST SERPL-MCNC: 159 MG/DL
HDLC SERPL-MCNC: 66 MG/DL
LDLC SERPL CALC-MCNC: 73 MG/DL
NONHDLC SERPL-MCNC: 93 MG/DL
TRIGL SERPL-MCNC: 112 MG/DL

## 2025-05-14 NOTE — RESULTS/DATA
Physician notified by staff. [FreeTextEntry1] : review of data in EM HR of scans and blood testing out of our system; current CBC WBC 3.79 HGB 13  000 normal differential and appearance of blood cells\par  mild hemolysis falsely elevated\par normal CMP and normal hepatic panel

## 2025-07-19 ENCOUNTER — OUTPATIENT (OUTPATIENT)
Dept: OUTPATIENT SERVICES | Facility: HOSPITAL | Age: 80
LOS: 1 days | Discharge: HOME | End: 2025-07-19

## 2025-07-19 DIAGNOSIS — C85.80 OTHER SPECIFIED TYPES OF NON-HODGKIN LYMPHOMA, UNSPECIFIED SITE: ICD-10-CM

## 2025-07-22 ENCOUNTER — RESULT REVIEW (OUTPATIENT)
Age: 80
End: 2025-07-22

## 2025-07-22 ENCOUNTER — APPOINTMENT (OUTPATIENT)
Dept: HEMATOLOGY ONCOLOGY | Facility: CLINIC | Age: 80
End: 2025-07-22
Payer: MEDICARE

## 2025-07-22 VITALS
RESPIRATION RATE: 16 BRPM | WEIGHT: 113.98 LBS | SYSTOLIC BLOOD PRESSURE: 121 MMHG | DIASTOLIC BLOOD PRESSURE: 62 MMHG | HEART RATE: 55 BPM | TEMPERATURE: 97.3 F | BODY MASS INDEX: 15.46 KG/M2 | OXYGEN SATURATION: 98 %

## 2025-07-22 DIAGNOSIS — Z87.898 PERSONAL HISTORY OF OTHER SPECIFIED CONDITIONS: ICD-10-CM

## 2025-07-22 DIAGNOSIS — Z92.22 PERSONAL HISTORY OF MONOCLONAL DRUG THERAPY: ICD-10-CM

## 2025-07-22 DIAGNOSIS — Z92.89 PERSONAL HISTORY OF OTHER MEDICAL TREATMENT: ICD-10-CM

## 2025-07-22 DIAGNOSIS — C88.40 EXTRNOD MRGNL B-CELL LYMPH MUCOSA-ASSOC LYM TISS NOT REMIS: ICD-10-CM

## 2025-07-22 DIAGNOSIS — J84.9 INTERSTITIAL PULMONARY DISEASE, UNSPECIFIED: ICD-10-CM

## 2025-07-22 DIAGNOSIS — R05.3 CHRONIC COUGH: ICD-10-CM

## 2025-07-22 DIAGNOSIS — D80.1 NONFAMILIAL HYPOGAMMAGLOBULINEMIA: ICD-10-CM

## 2025-07-22 LAB
ALBUMIN SERPL ELPH-MCNC: 4.8 G/DL
ALP BLD-CCNC: 77 U/L
ALT SERPL-CCNC: 22 U/L
ANION GAP SERPL CALC-SCNC: 15 MMOL/L
AST SERPL-CCNC: 23 U/L
BASOPHILS # BLD AUTO: 0.03 K/UL — SIGNIFICANT CHANGE UP (ref 0–0.2)
BASOPHILS NFR BLD AUTO: 0.5 % — SIGNIFICANT CHANGE UP (ref 0–2)
BILIRUB SERPL-MCNC: 0.4 MG/DL
BUN SERPL-MCNC: 26 MG/DL
CALCIUM SERPL-MCNC: 10 MG/DL
CHLORIDE SERPL-SCNC: 102 MMOL/L
CO2 SERPL-SCNC: 24 MMOL/L
CREAT SERPL-MCNC: 0.64 MG/DL
DEPRECATED KAPPA LC FREE/LAMBDA SER: 0.84 RATIO
EGFRCR SERPLBLD CKD-EPI 2021: 90 ML/MIN/1.73M2
EOSINOPHIL # BLD AUTO: 0.08 K/UL — SIGNIFICANT CHANGE UP (ref 0–0.5)
EOSINOPHIL NFR BLD AUTO: 1.4 % — SIGNIFICANT CHANGE UP (ref 0–6)
ESTIMATED AVERAGE GLUCOSE: 131 MG/DL
GLUCOSE SERPL-MCNC: 115 MG/DL
HBA1C MFR BLD HPLC: 6.2 %
HCT VFR BLD CALC: 42.5 % — SIGNIFICANT CHANGE UP (ref 34.5–45)
HGB BLD-MCNC: 14.3 G/DL — SIGNIFICANT CHANGE UP (ref 11.5–15.5)
IGA SERPL-MCNC: 94 MG/DL
IGG SERPL-MCNC: 524 MG/DL
IGM SERPL-MCNC: 70 MG/DL
IMM GRANULOCYTES NFR BLD AUTO: 0.4 % — SIGNIFICANT CHANGE UP (ref 0–0.9)
KAPPA LC CSF-MCNC: 1.52 MG/DL
KAPPA LC SERPL-MCNC: 1.27 MG/DL
LDH SERPL-CCNC: 178 U/L
LYMPHOCYTES # BLD AUTO: 1.44 K/UL — SIGNIFICANT CHANGE UP (ref 1–3.3)
LYMPHOCYTES # BLD AUTO: 25.6 % — SIGNIFICANT CHANGE UP (ref 13–44)
MCHC RBC-ENTMCNC: 32.1 PG — SIGNIFICANT CHANGE UP (ref 27–34)
MCHC RBC-ENTMCNC: 33.6 G/DL — SIGNIFICANT CHANGE UP (ref 32–36)
MCV RBC AUTO: 95.3 FL — SIGNIFICANT CHANGE UP (ref 80–100)
MONOCYTES # BLD AUTO: 0.57 K/UL — SIGNIFICANT CHANGE UP (ref 0–0.9)
MONOCYTES NFR BLD AUTO: 10.1 % — SIGNIFICANT CHANGE UP (ref 2–14)
NEUTROPHILS # BLD AUTO: 3.48 K/UL — SIGNIFICANT CHANGE UP (ref 1.8–7.4)
NEUTROPHILS NFR BLD AUTO: 62 % — SIGNIFICANT CHANGE UP (ref 43–77)
NRBC BLD AUTO-RTO: 0 /100 WBCS — SIGNIFICANT CHANGE UP (ref 0–0)
PLATELET # BLD AUTO: 277 K/UL — SIGNIFICANT CHANGE UP (ref 150–400)
POTASSIUM SERPL-SCNC: 4.1 MMOL/L
PROT SERPL-MCNC: 7 G/DL
RBC # BLD: 4.46 M/UL — SIGNIFICANT CHANGE UP (ref 3.8–5.2)
RBC # FLD: 12.6 % — SIGNIFICANT CHANGE UP (ref 10.3–14.5)
SODIUM SERPL-SCNC: 141 MMOL/L
WBC # BLD: 5.62 K/UL — SIGNIFICANT CHANGE UP (ref 3.8–10.5)
WBC # FLD AUTO: 5.62 K/UL — SIGNIFICANT CHANGE UP (ref 3.8–10.5)

## 2025-07-22 PROCEDURE — 99213 OFFICE O/P EST LOW 20 MIN: CPT

## 2025-07-22 PROCEDURE — G2211 COMPLEX E/M VISIT ADD ON: CPT
